# Patient Record
Sex: MALE | Race: WHITE | NOT HISPANIC OR LATINO | URBAN - METROPOLITAN AREA
[De-identification: names, ages, dates, MRNs, and addresses within clinical notes are randomized per-mention and may not be internally consistent; named-entity substitution may affect disease eponyms.]

---

## 2018-03-14 ENCOUNTER — EMERGENCY (EMERGENCY)
Facility: HOSPITAL | Age: 58
LOS: 1 days | Discharge: HOME | End: 2018-03-14
Attending: EMERGENCY MEDICINE
Payer: COMMERCIAL

## 2018-03-14 VITALS
HEART RATE: 84 BPM | TEMPERATURE: 98 F | DIASTOLIC BLOOD PRESSURE: 79 MMHG | SYSTOLIC BLOOD PRESSURE: 150 MMHG | OXYGEN SATURATION: 97 % | RESPIRATION RATE: 18 BRPM

## 2018-03-14 VITALS — WEIGHT: 250 LBS | HEIGHT: 72 IN

## 2018-03-14 DIAGNOSIS — Z86.69 PERSONAL HISTORY OF OTHER DISEASES OF THE NERVOUS SYSTEM AND SENSE ORGANS: Chronic | ICD-10-CM

## 2018-03-14 PROBLEM — Z00.00 ENCOUNTER FOR PREVENTIVE HEALTH EXAMINATION: Status: ACTIVE | Noted: 2018-03-14

## 2018-03-14 PROCEDURE — 99283 EMERGENCY DEPT VISIT LOW MDM: CPT

## 2018-03-14 PROCEDURE — 99282 EMERGENCY DEPT VISIT SF MDM: CPT

## 2018-03-14 RX ORDER — CIPROFLOXACIN AND DEXAMETHASONE 3; 1 MG/ML; MG/ML
4 SUSPENSION/ DROPS AURICULAR (OTIC)
Qty: 1 | Refills: 0
Start: 2018-03-14 | End: 2018-03-20

## 2018-03-14 NOTE — ED STATDOCS - OBJECTIVE STATEMENT
56 y/o M, presents to the ED c/o bloody discharge from his left ear s/p ear tube placement for chronic Otitis media.  Associated sx include ear numbness and muffled hearing.  Denies recent trauma or scratching of his ear.  Denies CP or SOB.

## 2018-03-14 NOTE — ED STATDOCS - ATTENDING CONTRIBUTION TO CARE
56 y/o M, presents to the ED c/o bloody discharge from his left ear s/p ear tube placement for chronic Otitis media.  The patient has blood in the auditory canal s/p ear tube      I, Alfonso Albarran, performed the initial face to face bedside interview with this patient regarding history of present illness, review of symptoms and relevant past medical, social and family history.  I completed an independent physical examination.  I was the initial provider who evaluated this patient. I have signed out the follow up of any pending tests (i.e. labs, radiological studies) to the ACP.  I have communicated the patient’s plan of care and disposition with the ACP.  The history, relevant review of systems, past medical and surgical history, medical decision making, and physical examination was documented by the scribe in my presence and I attest to the accuracy of the documentation.

## 2018-03-14 NOTE — ED STATDOCS - ENMT, MLM
Moist mucous membranes. left ear contains dried and fresh blood in auditory canal. can not visual TM

## 2018-03-14 NOTE — ED STATDOCS - PROGRESS NOTE DETAILS
Case d/w Dr Arredondo of ENT stating that it is ear infection with bleeding in the ear with ear tube Case d/w Dr Arredondo of ENT stating that it is ear infection with bleeding in the ear with ear tube. Dr Arredondo suggest Augmentin and Ciprodex. F/U with Dr. Arredondo as discussed.

## 2020-09-24 ENCOUNTER — APPOINTMENT (OUTPATIENT)
Dept: OTOLARYNGOLOGY | Facility: CLINIC | Age: 60
End: 2020-09-24
Payer: COMMERCIAL

## 2020-09-24 VITALS
HEIGHT: 73 IN | WEIGHT: 260 LBS | BODY MASS INDEX: 34.46 KG/M2 | HEART RATE: 86 BPM | DIASTOLIC BLOOD PRESSURE: 82 MMHG | SYSTOLIC BLOOD PRESSURE: 140 MMHG | TEMPERATURE: 97.1 F

## 2020-09-24 PROCEDURE — 31231 NASAL ENDOSCOPY DX: CPT

## 2020-09-24 PROCEDURE — 99204 OFFICE O/P NEW MOD 45 MIN: CPT | Mod: 25

## 2020-09-24 RX ORDER — METHYLPREDNISOLONE 4 MG/1
4 TABLET ORAL
Qty: 1 | Refills: 1 | Status: ACTIVE | COMMUNITY
Start: 2020-09-24 | End: 1900-01-01

## 2020-09-24 RX ORDER — SULFACETAMIDE SODIUM AND PREDNISOLONE SODIUM PHOSPHATE 100; 2.3 MG/ML; MG/ML
10-0.23 SOLUTION/ DROPS OPHTHALMIC
Qty: 1 | Refills: 5 | Status: ACTIVE | COMMUNITY
Start: 2020-09-24 | End: 1900-01-01

## 2020-09-24 NOTE — REASON FOR VISIT
[Initial Evaluation] : an initial evaluation for [Ear Drainage] : ear drainage [Hearing Loss] : hearing loss

## 2020-09-24 NOTE — ASSESSMENT
[FreeTextEntry1] : Left chr otorrhea\par C&S-left ear taken\par Rx- medrol dospak and  blephamide drops\par r/o CSF otorrhea\par    consider MRI of Temporal Bones and collect fluid for analysis\par \par f/u 10 days\par

## 2020-09-24 NOTE — HISTORY OF PRESENT ILLNESS
[M & T] : myringotomy tube [de-identified] : 61 yo male\par Chr left ear infections and drainage despite M&T and h2o precautions x months/years\par CT scan- opacification of middle ear and mastoid\par no nasla congestion\par no headaches\par no other modifying factors\par no nasal or throat complaints\par  [Tinnitus] : no tinnitus [Vertigo] : no vertigo [Anxiety] : no anxiety [Dizziness] : no dizziness [Headache] : no headache [Hearing Loss] : hearing loss [Otalgia] : no otalgia [Otorrhea] : no otorrhea [Recurrent Otitis Media] : recurrent otitis media [Otitis Media with Effusion] : otitis media with effusion [Meniere Disease] : no Meniere disease [Eustachian Tube Dysfunction] : eustachian tube dysfunction [Otosclerosis] : no otosclerosis [Perilymphatic Fistula] : no perilymphatic fistula [Hypertension] : no hypertension [Loud Noise Exposure] : no history of loud noise exposure [Smoking] : no smoking [Early Onset Hearing Loss] : no early onset hearing loss [Stroke] : no stroke [Facial Pain] : no facial pain [Facial Pressure] : no facial pressure [Nasal Congestion] : no nasal congestion [Ear Fullness] : no ear fullness [Allergic Rhinitis] : no allergic rhinitis [Environmental Allergies] : no environmental allergies [Seasonal Allergies] : no seasonal allergies [Environmental Allergens] : no environmental allergens [Adenoidectomy] : no adenoidectomy [Allergies] : no allergies [Asthma] : no asthma [Neck Mass] : no neck mass [Neck Pain] : no neck pain [Chills] : no chills [Cold Intolerance] : no cold intolerance [Cough] : no cough [Fatigue] : no fatigue [Heat Intolerance] : no heat intolerance [Hyperthyroidism] : no hyperthyroidism [Sialadenitis] : no sialadenitis [Hodgkin Disease] : no hodgkin disease [Non-Hodgkin Lymphoma] : no non-hodgkin lymphoma [Tobacco Use] : no tobacco use [Alcohol Use] : no alcohol use [Graves Disease] : no graves disease [Thyroid Cancer] : no thyroid cancer

## 2020-09-24 NOTE — PROCEDURE
[FreeTextEntry6] : \par Anterior Rhinoscopy insufficient to account for symptoms.\par \par After informed verbal consent is obtained, the fiberoptic nasal endoscope #29 is passed via the right nasal cavity.\par The following anatomic sites were directly examined in a sequential fashion:\par The scope was introduced in both  nasal passages between the middle and inferior turbinates to exam the inferior portion of the middle meatus and the fontanelle, as well as the maxillary ostia.  Next, the scope was passed medially and posteriorly to the middle turbinates to examine the sphenoethmoid recess and the superior turbinate region.\par   There is [ 0 ]% obstruction of the nasopharynx with adenoid tissue.\par \par A sl deviated nasal septum was noted not causing obstruction.\par The turbinates were congested-bilateral.\par \par Right Side:\par ·	Mucosa: wnl\par ·	Mucous: none\par ·	Polyp: none\par ·	Inferior Turbinate: sl congested\par ·	Middle Turbinate:sl congested\par ·	Superior Turbinate: wnl\par ·	Inferior Meatus:clear\par ·	Middle Meatus: clear\par ·	Super Meatus: clear\par ·	Sphenoethmoidal Recess: wnl\par \par \par \par Left Side:\par ·	Mucosa: wnl\par ·	Mucous:none\par ·	Polyp: none\par ·	Inferior Turbinate: sl congested\par ·	Middle Turbinate: sl congested\par ·	Superior Turbinate:wnl\par ·	Inferior Meatus: clear\par ·	Middle Meatus: clear\par ·	Super Meatus:clear\par ·	Sphenoethmoidal Recess: wnl\par \par \par

## 2020-09-24 NOTE — PHYSICAL EXAM
[de-identified] : left ear wax [de-identified] : left tube draining pulsating clear fluid [Nasal Endoscopy Performed] : nasal endoscopy was performed, see procedure section for findings [] : septum deviated to the left [Midline] : trachea located in midline position [Normal] : no rashes

## 2020-09-24 NOTE — PHYSICAL EXAM
[de-identified] : left ear wax [de-identified] : left tube draining pulsating clear fluid [Nasal Endoscopy Performed] : nasal endoscopy was performed, see procedure section for findings [] : septum deviated to the left [Midline] : trachea located in midline position [Normal] : no rashes

## 2020-09-24 NOTE — HISTORY OF PRESENT ILLNESS
[M & T] : myringotomy tube [de-identified] : 61 yo male\par Chr left ear infections and drainage despite M&T and h2o precautions x months/years\par CT scan- opacification of middle ear and mastoid\par no nasla congestion\par no headaches\par no other modifying factors\par no nasal or throat complaints\par  [Tinnitus] : no tinnitus [Vertigo] : no vertigo [Anxiety] : no anxiety [Dizziness] : no dizziness [Headache] : no headache [Hearing Loss] : hearing loss [Otalgia] : no otalgia [Otorrhea] : no otorrhea [Recurrent Otitis Media] : recurrent otitis media [Otitis Media with Effusion] : otitis media with effusion [Meniere Disease] : no Meniere disease [Eustachian Tube Dysfunction] : eustachian tube dysfunction [Otosclerosis] : no otosclerosis [Perilymphatic Fistula] : no perilymphatic fistula [Hypertension] : no hypertension [Loud Noise Exposure] : no history of loud noise exposure [Smoking] : no smoking [Early Onset Hearing Loss] : no early onset hearing loss [Stroke] : no stroke [Facial Pain] : no facial pain [Facial Pressure] : no facial pressure [Nasal Congestion] : no nasal congestion [Ear Fullness] : no ear fullness [Allergic Rhinitis] : no allergic rhinitis [Environmental Allergies] : no environmental allergies [Seasonal Allergies] : no seasonal allergies [Environmental Allergens] : no environmental allergens [Adenoidectomy] : no adenoidectomy [Allergies] : no allergies [Asthma] : no asthma [Neck Mass] : no neck mass [Neck Pain] : no neck pain [Chills] : no chills [Cold Intolerance] : no cold intolerance [Cough] : no cough [Fatigue] : no fatigue [Heat Intolerance] : no heat intolerance [Hyperthyroidism] : no hyperthyroidism [Sialadenitis] : no sialadenitis [Hodgkin Disease] : no hodgkin disease [Non-Hodgkin Lymphoma] : no non-hodgkin lymphoma [Tobacco Use] : no tobacco use [Alcohol Use] : no alcohol use [Graves Disease] : no graves disease [Thyroid Cancer] : no thyroid cancer

## 2020-09-27 ENCOUNTER — NON-APPOINTMENT (OUTPATIENT)
Age: 60
End: 2020-09-27

## 2020-09-28 LAB — BACTERIA SPEC CULT: NORMAL

## 2020-10-03 ENCOUNTER — APPOINTMENT (OUTPATIENT)
Dept: OTOLARYNGOLOGY | Facility: CLINIC | Age: 60
End: 2020-10-03
Payer: COMMERCIAL

## 2020-10-03 VITALS
SYSTOLIC BLOOD PRESSURE: 137 MMHG | BODY MASS INDEX: 34.46 KG/M2 | TEMPERATURE: 98.2 F | HEIGHT: 73 IN | WEIGHT: 260 LBS | DIASTOLIC BLOOD PRESSURE: 83 MMHG | HEART RATE: 88 BPM

## 2020-10-03 PROCEDURE — 92504 EAR MICROSCOPY EXAMINATION: CPT

## 2020-10-03 PROCEDURE — 99214 OFFICE O/P EST MOD 30 MIN: CPT | Mod: 25

## 2020-10-03 NOTE — PROCEDURE
[FreeTextEntry3] : Procedure note:  Binocular microscopy\par \par Oct 03, 2020 \par \par Inspection of the ears was performed under binocular microscopy because of need to accurately visualize otologic landmarks and potential pathologic conditions that would not otherwise be visible through simple otoscopic exam.\par

## 2020-10-03 NOTE — HISTORY OF PRESENT ILLNESS
[de-identified] : 60M with left ear drainage since left tube placement in March.  Occurring daily, clear to milky colored.  Associated popping/hearing loss left ear.  Has been present at least 5 years, possibly longer.  Mother with history of cholesteatoma.  Did not have ear infections

## 2020-10-03 NOTE — PHYSICAL EXAM
[de-identified] : AS:  patent tube with drainage, adjacent granulation, no retraction  AD:  normal [Normal] : no rashes [FreeTextEntry2] : Facial nerve function is House Brackmann 1/6 in right ear and 1/6 in left ear.

## 2020-10-05 ENCOUNTER — APPOINTMENT (OUTPATIENT)
Dept: OTOLARYNGOLOGY | Facility: CLINIC | Age: 60
End: 2020-10-05

## 2020-10-08 LAB — BETA-2 TRANSFERRIN: POSITIVE

## 2020-10-09 ENCOUNTER — OUTPATIENT (OUTPATIENT)
Dept: OUTPATIENT SERVICES | Facility: HOSPITAL | Age: 60
LOS: 1 days | End: 2020-10-09
Payer: COMMERCIAL

## 2020-10-09 ENCOUNTER — APPOINTMENT (OUTPATIENT)
Dept: MRI IMAGING | Facility: CLINIC | Age: 60
End: 2020-10-09
Payer: COMMERCIAL

## 2020-10-09 DIAGNOSIS — Z86.69 PERSONAL HISTORY OF OTHER DISEASES OF THE NERVOUS SYSTEM AND SENSE ORGANS: Chronic | ICD-10-CM

## 2020-10-09 DIAGNOSIS — H65.492 OTHER CHRONIC NONSUPPURATIVE OTITIS MEDIA, LEFT EAR: ICD-10-CM

## 2020-10-09 PROCEDURE — A9585: CPT

## 2020-10-09 PROCEDURE — 70553 MRI BRAIN STEM W/O & W/DYE: CPT | Mod: 26

## 2020-10-09 PROCEDURE — 70553 MRI BRAIN STEM W/O & W/DYE: CPT

## 2020-10-19 ENCOUNTER — APPOINTMENT (OUTPATIENT)
Dept: OTOLARYNGOLOGY | Facility: CLINIC | Age: 60
End: 2020-10-19
Payer: COMMERCIAL

## 2020-10-19 VITALS
WEIGHT: 260 LBS | BODY MASS INDEX: 34.46 KG/M2 | HEIGHT: 73 IN | TEMPERATURE: 97.6 F | SYSTOLIC BLOOD PRESSURE: 139 MMHG | DIASTOLIC BLOOD PRESSURE: 83 MMHG | HEART RATE: 95 BPM

## 2020-10-19 PROCEDURE — 92557 COMPREHENSIVE HEARING TEST: CPT

## 2020-10-19 PROCEDURE — 99072 ADDL SUPL MATRL&STAF TM PHE: CPT

## 2020-10-19 PROCEDURE — 99214 OFFICE O/P EST MOD 30 MIN: CPT | Mod: 25

## 2020-10-19 PROCEDURE — 92567 TYMPANOMETRY: CPT

## 2020-10-19 NOTE — PHYSICAL EXAM
[Normal] : no rashes [de-identified] : AS:  patent tube with drainage, adjacent granulation, no retraction  AD:  normal [FreeTextEntry2] : Facial nerve function is House Brackmann 1/6 in right ear and 1/6 in left ear.

## 2020-10-19 NOTE — CONSULT LETTER
[FreeTextEntry2] : Flako Cardona MD [FreeTextEntry1] : Dear Flako,\par \par Mr. Gilligan presents for followup for his left CSF otorrhea.  Since his last visit he has undergone MRI, and his sampled left ear drainage was found to be positive for beta-2 transferrin.  He has also seen one of my neurosurgery colleagues Dr. Garza.  We again discussed details and risks of transmastoid/middle repair of his CSF leak at length today, and he is ready to proceed.  This will be set up in the coming weeks.\par \par Thank you once again for the opportunity to participate in your patient's care, and I will keep you informed as to his progress.\par \par Best regards,\par \par Jai Carlson MD\par Otology/Neurotology\par [facility]\par [facility]\par

## 2020-10-19 NOTE — REASON FOR VISIT
[Subsequent Evaluation] : a subsequent evaluation for [FreeTextEntry2] : ear drainage, pt presents to follow up before surgery

## 2020-10-19 NOTE — DATA REVIEWED
[de-identified] : I personally reviewed the patient's audiogram, which shows left mostly CHL with a-b gap across all frequencies.\par  [de-identified] : I personally reviewed MRI images and the report, which shows left mastoid opacification, minor linear enhancement involving left middle fossa floor.\par

## 2020-10-23 ENCOUNTER — OUTPATIENT (OUTPATIENT)
Dept: OUTPATIENT SERVICES | Facility: HOSPITAL | Age: 60
LOS: 1 days | End: 2020-10-23
Payer: COMMERCIAL

## 2020-10-23 VITALS
RESPIRATION RATE: 14 BRPM | SYSTOLIC BLOOD PRESSURE: 146 MMHG | DIASTOLIC BLOOD PRESSURE: 92 MMHG | WEIGHT: 268.08 LBS | TEMPERATURE: 99 F | OXYGEN SATURATION: 97 % | HEIGHT: 72 IN | HEART RATE: 89 BPM

## 2020-10-23 DIAGNOSIS — H92.12 OTORRHEA, LEFT EAR: ICD-10-CM

## 2020-10-23 DIAGNOSIS — Z86.69 PERSONAL HISTORY OF OTHER DISEASES OF THE NERVOUS SYSTEM AND SENSE ORGANS: Chronic | ICD-10-CM

## 2020-10-23 DIAGNOSIS — Z98.52 VASECTOMY STATUS: Chronic | ICD-10-CM

## 2020-10-23 LAB
ANION GAP SERPL CALC-SCNC: 10 MMO/L — SIGNIFICANT CHANGE UP (ref 7–14)
BLD GP AB SCN SERPL QL: NEGATIVE — SIGNIFICANT CHANGE UP
BUN SERPL-MCNC: 16 MG/DL — SIGNIFICANT CHANGE UP (ref 7–23)
CALCIUM SERPL-MCNC: 8.7 MG/DL — SIGNIFICANT CHANGE UP (ref 8.4–10.5)
CHLORIDE SERPL-SCNC: 102 MMOL/L — SIGNIFICANT CHANGE UP (ref 98–107)
CO2 SERPL-SCNC: 26 MMOL/L — SIGNIFICANT CHANGE UP (ref 22–31)
CREAT SERPL-MCNC: 0.83 MG/DL — SIGNIFICANT CHANGE UP (ref 0.5–1.3)
GLUCOSE SERPL-MCNC: 194 MG/DL — HIGH (ref 70–99)
HCT VFR BLD CALC: 42.3 % — SIGNIFICANT CHANGE UP (ref 39–50)
HGB BLD-MCNC: 14.4 G/DL — SIGNIFICANT CHANGE UP (ref 13–17)
MCHC RBC-ENTMCNC: 29.2 PG — SIGNIFICANT CHANGE UP (ref 27–34)
MCHC RBC-ENTMCNC: 34 % — SIGNIFICANT CHANGE UP (ref 32–36)
MCV RBC AUTO: 85.8 FL — SIGNIFICANT CHANGE UP (ref 80–100)
NRBC # FLD: 0 K/UL — SIGNIFICANT CHANGE UP (ref 0–0)
PLATELET # BLD AUTO: 205 K/UL — SIGNIFICANT CHANGE UP (ref 150–400)
PMV BLD: 9 FL — SIGNIFICANT CHANGE UP (ref 7–13)
POTASSIUM SERPL-MCNC: 4 MMOL/L — SIGNIFICANT CHANGE UP (ref 3.5–5.3)
POTASSIUM SERPL-SCNC: 4 MMOL/L — SIGNIFICANT CHANGE UP (ref 3.5–5.3)
RBC # BLD: 4.93 M/UL — SIGNIFICANT CHANGE UP (ref 4.2–5.8)
RBC # FLD: 13.5 % — SIGNIFICANT CHANGE UP (ref 10.3–14.5)
RH IG SCN BLD-IMP: POSITIVE — SIGNIFICANT CHANGE UP
SODIUM SERPL-SCNC: 138 MMOL/L — SIGNIFICANT CHANGE UP (ref 135–145)
WBC # BLD: 8.75 K/UL — SIGNIFICANT CHANGE UP (ref 3.8–10.5)
WBC # FLD AUTO: 8.75 K/UL — SIGNIFICANT CHANGE UP (ref 3.8–10.5)

## 2020-10-23 PROCEDURE — 93010 ELECTROCARDIOGRAM REPORT: CPT

## 2020-10-23 RX ORDER — SODIUM CHLORIDE 9 MG/ML
1000 INJECTION, SOLUTION INTRAVENOUS
Refills: 0 | Status: DISCONTINUED | OUTPATIENT
Start: 2020-10-29 | End: 2020-10-30

## 2020-10-23 RX ORDER — SODIUM CHLORIDE 9 MG/ML
3 INJECTION INTRAMUSCULAR; INTRAVENOUS; SUBCUTANEOUS EVERY 8 HOURS
Refills: 0 | Status: DISCONTINUED | OUTPATIENT
Start: 2020-10-29 | End: 2020-11-01

## 2020-10-23 NOTE — H&P PST ADULT - HISTORY OF PRESENT ILLNESS
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 60 year old male presents with PST with chronic left ear drainage, progressively worsening recent evaluation tested fluid and noted CSF fluid leak, patient reports fullness of left ear with subsequent tubes placed to drain fluid and relieve pressure; denies headaches, weakness, changes in mental statusor any other complaint, Denies trauma or head injury  MRI imaging noted cerebellar herniation, now scheduled for infratemporal approach 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

## 2020-10-23 NOTE — H&P PST ADULT - NEGATIVE ENMT SYMPTOMS
no throat pain/no dysphagia/no nasal congestion/no vertigo/no ear pain/no nasal discharge/no dry mouth

## 2020-10-23 NOTE — H&P PST ADULT - NEUROLOGICAL DETAILS
alert and oriented x 3/sensation intact/normal strength sensation intact/normal strength/alert and oriented x 3/responds to verbal commands

## 2020-10-23 NOTE — H&P PST ADULT - ASSESSMENT
Problem: CSF leak  Assessment and Plan: Patient scheduled for surgery on 10/29/2020  Patient provided with verbal and written presurgical instructions; verbalized understanding  with teach back.    Patient provided with famotidine for GI prophylaxis; verbalized understanding.    Patient confirmed COVID appointment     OR booking notified of ADINA    EKG, Echo and Stress test requested    Cardiac evaluation requested by PST for abnormal EKG,  will obtain  Patient instructed to stop multivitamins and asa today    Problem: CSF leak  Assessment and Plan: Patient scheduled for surgery on 10/29/2020  Patient provided with verbal and written presurgical instructions; verbalized understanding  with teach back.    Chlorhexidene wash instructions discussed and verblaized understanding with teachback   Patient provided with famotidine for GI prophylaxis; verbalized understanding.    Patient confirmed COVID appointment     OR booking notified of ADINA    EKG, Echo and Stress test requested    Cardiac evaluation requested by PST for abnormal EKG, and elevated BP  will obtain  Patient instructed to stop multivitamins and asa today

## 2020-10-23 NOTE — H&P PST ADULT - ATTENDING COMMENTS
explained all the r/b/a of left middle fossa repair of csf leak with lumbar drain.  risk include but not limited to bleeding infection stroke paralyis death, csf leak needing further repair or shunt.  he understands and wishes to proceed with surgery

## 2020-10-23 NOTE — H&P PST ADULT - RS GEN PE MLT RESP DETAILS PC
no rales/breath sounds equal/clear to auscultation bilaterally/respirations non-labored/good air movement/airway patent/no rhonchi/no wheezes

## 2020-10-23 NOTE — H&P PST ADULT - NEGATIVE OPHTHALMOLOGIC SYMPTOMS
no irritation L/no loss of vision R/no blurred vision R/no blurred vision L/no irritation R/no diplopia/no photophobia/no loss of vision L

## 2020-10-23 NOTE — H&P PST ADULT - NSICDXPASTMEDICALHX_GEN_ALL_CORE_FT
PAST MEDICAL HISTORY:  Cerebrospinal fluid leak     LBBB (left bundle branch block)     Other chronic suppurative otitis media     Otorrhea, left

## 2020-10-23 NOTE — H&P PST ADULT - NEGATIVE NEUROLOGICAL SYMPTOMS
no difficulty walking/no weakness/no paresthesias/no generalized seizures/no confusion/no headache/no loss of sensation/no focal seizures/no syncope/no vertigo/no loss of consciousness/no hemiparesis/no facial palsy

## 2020-10-25 DIAGNOSIS — Z01.818 ENCOUNTER FOR OTHER PREPROCEDURAL EXAMINATION: ICD-10-CM

## 2020-10-26 ENCOUNTER — APPOINTMENT (OUTPATIENT)
Dept: DISASTER EMERGENCY | Facility: CLINIC | Age: 60
End: 2020-10-26

## 2020-10-26 LAB — SARS-COV-2 N GENE NPH QL NAA+PROBE: NOT DETECTED

## 2020-10-28 ENCOUNTER — TRANSCRIPTION ENCOUNTER (OUTPATIENT)
Age: 60
End: 2020-10-28

## 2020-10-29 ENCOUNTER — INPATIENT (INPATIENT)
Facility: HOSPITAL | Age: 60
LOS: 2 days | Discharge: HOME | End: 2020-11-01
Attending: NEUROLOGICAL SURGERY | Admitting: OTOLARYNGOLOGY
Payer: COMMERCIAL

## 2020-10-29 ENCOUNTER — APPOINTMENT (OUTPATIENT)
Dept: OTOLARYNGOLOGY | Facility: HOSPITAL | Age: 60
End: 2020-10-29

## 2020-10-29 VITALS
OXYGEN SATURATION: 96 % | DIASTOLIC BLOOD PRESSURE: 89 MMHG | RESPIRATION RATE: 14 BRPM | HEART RATE: 90 BPM | TEMPERATURE: 90 F | WEIGHT: 268.08 LBS | HEIGHT: 72 IN | SYSTOLIC BLOOD PRESSURE: 155 MMHG

## 2020-10-29 DIAGNOSIS — H92.12 OTORRHEA, LEFT EAR: ICD-10-CM

## 2020-10-29 DIAGNOSIS — Z86.69 PERSONAL HISTORY OF OTHER DISEASES OF THE NERVOUS SYSTEM AND SENSE ORGANS: Chronic | ICD-10-CM

## 2020-10-29 DIAGNOSIS — Z98.890 OTHER SPECIFIED POSTPROCEDURAL STATES: ICD-10-CM

## 2020-10-29 DIAGNOSIS — Z98.52 VASECTOMY STATUS: Chronic | ICD-10-CM

## 2020-10-29 LAB
BASE EXCESS BLDA CALC-SCNC: -4 MMOL/L — SIGNIFICANT CHANGE UP
CA-I BLDA-SCNC: 1.07 MMOL/L — LOW (ref 1.15–1.29)
GLUCOSE BLDA-MCNC: 111 MG/DL — HIGH (ref 70–99)
HCO3 BLDA-SCNC: 21 MMOL/L — LOW (ref 22–26)
HCT VFR BLDA CALC: 41.8 % — SIGNIFICANT CHANGE UP (ref 39–51)
HGB BLDA-MCNC: 13.6 G/DL — SIGNIFICANT CHANGE UP (ref 13–17)
PCO2 BLDA: 44 MMHG — SIGNIFICANT CHANGE UP (ref 35–48)
PH BLDA: 7.3 PH — LOW (ref 7.35–7.45)
PO2 BLDA: 212 MMHG — HIGH (ref 83–108)
POTASSIUM BLDA-SCNC: 4.4 MMOL/L — SIGNIFICANT CHANGE UP (ref 3.4–4.5)
RH IG SCN BLD-IMP: POSITIVE — SIGNIFICANT CHANGE UP
SAO2 % BLDA: 98.6 % — SIGNIFICANT CHANGE UP (ref 95–99)
SODIUM BLDA-SCNC: 137 MMOL/L — SIGNIFICANT CHANGE UP (ref 136–146)

## 2020-10-29 PROCEDURE — 69502 MASTOIDECTOMY: CPT | Mod: LT

## 2020-10-29 PROCEDURE — 61618 REPAIR DURA: CPT

## 2020-10-29 PROCEDURE — 69620 MYRINGOPLASTY: CPT | Mod: LT

## 2020-10-29 PROCEDURE — 92516 FACIAL NERVE FUNCTION TEST: CPT

## 2020-10-29 PROCEDURE — 15769 GRFG AUTOL SOFT TISS DIR EXC: CPT

## 2020-10-29 RX ORDER — ACETAMINOPHEN 500 MG
975 TABLET ORAL ONCE
Refills: 0 | Status: COMPLETED | OUTPATIENT
Start: 2020-10-29 | End: 2020-10-29

## 2020-10-29 RX ORDER — INFLUENZA VIRUS VACCINE 15; 15; 15; 15 UG/.5ML; UG/.5ML; UG/.5ML; UG/.5ML
0.5 SUSPENSION INTRAMUSCULAR ONCE
Refills: 0 | Status: DISCONTINUED | OUTPATIENT
Start: 2020-10-29 | End: 2020-11-01

## 2020-10-29 RX ORDER — CEFAZOLIN SODIUM 1 G
2000 VIAL (EA) INJECTION EVERY 8 HOURS
Refills: 0 | Status: DISCONTINUED | OUTPATIENT
Start: 2020-10-29 | End: 2020-11-01

## 2020-10-29 RX ORDER — OXYCODONE HYDROCHLORIDE 5 MG/1
5 TABLET ORAL EVERY 6 HOURS
Refills: 0 | Status: DISCONTINUED | OUTPATIENT
Start: 2020-10-29 | End: 2020-10-30

## 2020-10-29 RX ADMIN — Medication 975 MILLIGRAM(S): at 21:53

## 2020-10-29 RX ADMIN — SODIUM CHLORIDE 3 MILLILITER(S): 9 INJECTION INTRAMUSCULAR; INTRAVENOUS; SUBCUTANEOUS at 22:16

## 2020-10-29 RX ADMIN — Medication 100 MILLIGRAM(S): at 21:53

## 2020-10-29 RX ADMIN — Medication 975 MILLIGRAM(S): at 22:40

## 2020-10-29 NOTE — PATIENT PROFILE ADULT - ARE SIGNIFICANT INDICATORS COMPLETE.
----- Message from Dayanara Linder sent at 10/4/2018  7:33 AM CDT -----  Regarding: injection  Pt meets the guidelines for any of these knee injection(s). No pre-cert required. Okay to schedule for bilateral knee(s). The expiration date is 12/31/18 and must be scheduled prior to that.   No Yes

## 2020-10-29 NOTE — PROGRESS NOTE ADULT - PROBLEM SELECTOR PLAN 1
Neurologic checks Q1H  Advance diet to clears tonight, to regular in AM  Lumbar drain 10cc/Hour  monitor for CSF leak

## 2020-10-29 NOTE — CONSULT NOTE ADULT - ASSESSMENT
60 year old male presents with PST with chronic left ear drainage, progressively worsening recent evaluation tested fluid and noted CSF fluid leak, patient reports fullness of left ear with subsequent tubes placed to drain fluid and relieve pressure; denies headaches, weakness, changes in mental status or any other complaint, Denies trauma or head injury. 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 with split thickness calvarial graft, insertion of lumbar drain with Dr aGrza; Dr Carlson- left repair of CSF leak with fat graft, tissue graft, cartilage graft , complete mastoidectomy    Plan     Neuro:   s/p encephalocele w/ total mastoidectomy  - Q1 Neuro checks  - Lumbar drain, 10cc drained q1h  - Tylenol 975 PRN  - Oxy 5 and 10 for pain    CV  - Normotensive, normocardic  - F/u BP and HR  - F/u ASA per Neuro surg  - Holding home ASA per neurosurg    Resp  - Satting well on RA    GI  - Clears, advance to fulls in AM      - F/u ToV    Heme  - Holding DVT PPx per neurosurg  ID  - Ancef 2 grams q8    Endo     Lines  - L Rad A- line  - PIV x 2

## 2020-10-29 NOTE — PROGRESS NOTE ADULT - ASSESSMENT
61 YO male stable postop Subtemporal craniotomy for repair of CSF leak and encephalocele with placement of lumbar drain

## 2020-10-29 NOTE — PROGRESS NOTE ADULT - SUBJECTIVE AND OBJECTIVE BOX
Neurosurgery postop  C/O mild incisional pain  ICU Vital Signs Last 24 Hrs  T(C): 36.8 (29 Oct 2020 20:10), Max: 36.8 (29 Oct 2020 20:10)  T(F): 98.2 (29 Oct 2020 20:10), Max: 98.2 (29 Oct 2020 20:10)  HR: 88 (29 Oct 2020 21:00) (88 - 96)  BP: 157/87 (29 Oct 2020 20:10) (155/89 - 157/87)  BP(mean): 103 (29 Oct 2020 20:10) (103 - 103)  ABP: 155/87 (29 Oct 2020 21:00) (134/63 - 155/87)  ABP(mean): 112 (29 Oct 2020 21:00) (90 - 112)  RR: 9 (29 Oct 2020 21:00) (9 - 14)  SpO2: 97% (29 Oct 2020 21:00) (96% - 98%)    AAO X 3  PERRLA, EOMI  CN 2-12 grossly intact  PERERA strength 5/5, no drift  SILT    Lumbar drain patent    Wound C/D/I    MEDICATIONS  (STANDING):  lactated ringers. 1000 milliLiter(s) (30 mL/Hr) IV Continuous <Continuous>  sodium chloride 0.9% lock flush 3 milliLiter(s) IV Push every 8 hours    MEDICATIONS  (PRN):

## 2020-10-29 NOTE — PATIENT PROFILE ADULT - SURGICAL SITE DRAINAGE DESCRIPTION
Electrodesiccation And Curettage Text: The wound bed was treated with electrodesiccation and curettage after the biopsy was performed. Anesthesia Volume In Cc (Will Not Render If 0): 0.6 Billing Type: Third-Party Bill Post-Care Instructions: I reviewed with the patient in detail post-care instructions. Patient is to keep the biopsy site dry overnight, and then apply vaseline twice daily until healed. Dressing: telfa dressing Biopsy Method: Personna blade Hemostasis: Drysol Silver Nitrate Text: The wound bed was treated with silver nitrate after the biopsy was performed. Was A Bandage Applied: Yes Curettage Text: The wound bed was treated with curettage after the biopsy was performed. Bill For Surgical Tray: no Notification Instructions: Patient will be notified of biopsy results. However, patient instructed to call the office if not contacted within 2 weeks. Detail Level: Detailed Consent: Verbal consent was obtained and risks were reviewed including but not limited to scarring, infection, bleeding, scabbing, incomplete removal, nerve damage and allergy to anesthesia. Lab: [id number] Wound Care: Vaseline Lab Facility: [id number] Additional Anesthesia Volume In Cc (Will Not Render If 0): 0 Cryotherapy Text: The wound bed was treated with cryotherapy after the biopsy was performed. Anesthesia Type: 1% lidocaine with 1:100,000 epinephrine and a 1:10 solution of 8.4% sodium bicarbonate Depth Of Biopsy: dermis Type Of Destruction Used: Curettage Biopsy Type: H and E Electrodesiccation Text: The wound bed was treated with electrodesiccation after the biopsy was performed. Billing Type: Third-Party Bill Lab: [id number] Lab Facility: [id number] Lumbar drain

## 2020-10-29 NOTE — CONSULT NOTE ADULT - SUBJECTIVE AND OBJECTIVE BOX
SICU Consultation Note  =====================================================    HPI: 60y male presents with PST with chronic left ear drainage, progressively worsening recent evaluation tested fluid and noted CSF fluid leak, patient reported fullness of left ear with subsequent tubes placed to drain fluid and relieve pressure; denied headaches, weakness, denied trauma or head injury changes in mental status or any other complaint . 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     Allergies: Augmentin (Vomiting; Nausea)    PAST MEDICAL & SURGICAL HISTORY:  Otorrhea, left    Other chronic suppurative otitis media    Cerebrospinal fluid leak    LBBB (left bundle branch block)    H/O vasectomy  2006    History of placement of ear tubes      FAMILY HISTORY:  FH: heart disease  father      SOCIAL HISTORY:   - Occasional smoker  - Quit drinking 1973    ADVANCE DIRECTIVES: Presumed Full Code     REVIEW OF SYSTEMS:  ***  General: Non-Contributory  Skin/Breast: Non-Contributory  Ophthalmologic: Non-Contributory  ENMT: Non-Contributory  Respiratory and Thorax: Non-Contributory  Cardiovascular: Non-Contributory  Gastrointestinal: Non-Contributory  Genitourinary: Non-Contributory  Musculoskeletal: Non-Contributory  Neurological: Non-Contributory  Psychiatric: Non-Contributory  Hematology/Lymphatics: Non-Contributory  Endocrine: Non-Contributory  Allergic/Immunologic: Non-Contributory    HOME MEDICATIONS:  ***    CURRENT MEDICATIONS:   --------------------------------------------------------------------------------------  Neurologic Medications    Respiratory Medications    Cardiovascular Medications    Gastrointestinal Medications  lactated ringers. 1000 milliLiter(s) IV Continuous <Continuous>  sodium chloride 0.9% lock flush 3 milliLiter(s) IV Push every 8 hours    Genitourinary Medications    Hematologic/Oncologic Medications    Antimicrobial/Immunologic Medications    Endocrine/Metabolic Medications    Topical/Other Medications    --------------------------------------------------------------------------------------    VITAL SIGNS, INS/OUTS (last 24 hours):  --------------------------------------------------------------------------------------  ((Insert SICU Vitals / Is+Os here)) ***  --------------------------------------------------------------------------------------    EXAM  NEUROLOGY  RASS:   	GCS:    Exam: Normal, NAD, alert, oriented x 3, no focal deficits. ***    HEENT  Exam: Normocephalic, atraumatic.  EOMI ***    RESPIRATORY  Exam: Lungs clear to auscultation, Normal expansion/effort.  ***  Mechanical Ventilation:     CARDIOVASCULAR  Exam: S1, S2.  Regular rate and rhythm.  Peripheral edema  ***    GI/NUTRITION  Exam: Abdomen soft, Non-tender, Non-distended.  ***  Wound:   ***  Current Diet:  NPO ***    VASCULAR  Exam: Extremities warm, pink, well-perfused.  ***    MUSCULOSKELETAL  Exam: All extremities moving spontaneously without limitations.  ***    SKIN:  Exam: Good skin turgor, no skin breakdown.  ***    METABOLIC/FLUIDS/ELECTROLYTES  lactated ringers. 1000 milliLiter(s) IV Continuous <Continuous>  sodium chloride 0.9% lock flush 3 milliLiter(s) IV Push every 8 hours      HEMATOLOGIC  [x] DVT Prophylaxis:   Transfusions:	[] PRBC	[] Platelets		[] FFP	[] Cryoprecipitate    INFECTIOUS DISEASE  Antimicrobials/Immunologic Medications:    Day #  	of    ***    Tubes/Lines/Drains  ***  [x] Peripheral IV  [] Central Venous Line     	[] R	[] L	[] IJ	[] Fem	[] SC	Date Placed:   [] Arterial Line		[] R	[] L	[] Fem	[] Rad	[] Ax	Date Placed:   [] PICC:         	[] Midline		[] Mediport  [] Urinary Catheter		Date Placed:     LABS  --------------------------------------------------------------------------------------  ((Insert SICU Labs here))***  --------------------------------------------------------------------------------------    OTHER LABS    IMAGING RESULTS  Echo:   CT:   Xray:     ASSESSMENT:  60y Male ***    PLAN:  ***  Neurologic:   Respiratory:   Cardiovascular:   Gastrointestinal/Nutrition:   Renal/Genitourinary:   Hematologic:   Infectious Disease:   Tubes/Lines/Drains:   Endocrine:   Disposition:     --------------------------------------------------------------------------------------    Critical Care Diagnoses:   SICU Consultation Note  =====================================================    HPI: 60y male presents with PST with chronic left ear drainage, progressively worsening recent evaluation tested fluid and noted CSF fluid leak, patient reported fullness of left ear with subsequent tubes placed to drain fluid and relieve pressure; denied headaches, weakness, denied trauma or head injury changes in mental status or any other complaint . 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     Allergies: Augmentin (Vomiting; Nausea)    PAST MEDICAL & SURGICAL HISTORY:  Otorrhea, left    Other chronic suppurative otitis media    Cerebrospinal fluid leak    LBBB (left bundle branch block)    H/O vasectomy  2006    History of placement of ear tubes      FAMILY HISTORY:  FH: heart disease  father      SOCIAL HISTORY:   - Occasional smoker  - Quit drinking 1973    ADVANCE DIRECTIVES: Presumed Full Code     REVIEW OF SYSTEMS:     HOME MEDICATIONS:      CURRENT MEDICATIONS:   --------------------------------------------------------------------------------------  Neurologic Medications  oxyCODONE    IR 5 milliGRAM(s) Oral every 6 hours PRN Moderate Pain (4 - 6)    Respiratory Medications    Cardiovascular Medications    Gastrointestinal Medications  lactated ringers. 1000 milliLiter(s) IV Continuous <Continuous>  sodium chloride 0.9% lock flush 3 milliLiter(s) IV Push every 8 hours    Genitourinary Medications    Hematologic/Oncologic Medications  influenza   Vaccine 0.5 milliLiter(s) IntraMuscular once    Antimicrobial/Immunologic Medications  ceFAZolin   IVPB 2000 milliGRAM(s) IV Intermittent every 8 hours    Endocrine/Metabolic Medications    Topical/Other Medications  --------------------------------------------------------------------------------------    Vitals  T(C): 36.8 (10-29-20 @ 20:10), Max: 36.8 (10-29-20 @ 20:10)  HR: 98 (10-29-20 @ 23:00) (88 - 98)  BP: 157/87 (10-29-20 @ 20:10) (155/89 - 157/87)  BP(mean): 103 (10-29-20 @ 20:10) (103 - 103)  ABP: 153/83 (10-29-20 @ 23:00) (134/63 - 155/87)  ABP(mean): 106 (10-29-20 @ 23:00) (90 - 112)  RR: 10 (10-29-20 @ 23:00) (9 - 14)  SpO2: 97% (10-29-20 @ 23:00) (96% - 99%)  Wt(kg): --  CVP(mm Hg): --  CI: --  CAPILLARY BLOOD GLUCOSE       N/A      10-29 @ 07:01  -  10-30 @ 00:12  --------------------------------------------------------  IN:    IV PiggyBack: 50 mL    Oral Fluid: 200 mL  Total IN: 250 mL    OUT:    Drain (mL): 30 mL    Indwelling Catheter - Urethral (mL): 550 mL  Total OUT: 580 mL    Total NET: -330 mL      EXAM  NEUROLOGY  Exam: Normal, NAD, alert, oriented x 3, no focal deficits.     HEENT  Exam: Normocephalic, atraumatic.  EOMI     RESPIRATORY  Exam: Lungs clear to auscultation, Normal expansion/effort.      CARDIOVASCULAR  Exam: S1, S2.  Regular rate and rhythm.     GI/NUTRITION  Exam: Abdomen soft, Non-tender, Non-distended.   Diet: Clears    VASCULAR  Exam: Extremities warm, pink, well-perfused.      MUSCULOSKELETAL  Exam: All extremities moving spontaneously without limitations.    SKIN:  Exam: Good skin turgor, no skin breakdown.     METABOLIC/FLUIDS/ELECTROLYTES  lactated ringers. 1000 milliLiter(s) IV Continuous <Continuous>  sodium chloride 0.9% lock flush 3 milliLiter(s) IV Push every 8 hours      HEMATOLOGIC  [x] DVT Prophylaxis:   Transfusions:	[] PRBC	[] Platelets		[] FFP	[] Cryoprecipitate    INFECTIOUS DISEASE  Antimicrobials/Immunologic Medications:  ceFAZolin   IVPB 2000 milliGRAM(s) IV Intermittent every 8 hours    Tubes/Lines/Drains    [x] Peripheral IV   [] Central Venous Line     	[] R	[] L	[] IJ	[] Fem	[] SC	Date Placed:   [] Arterial Line		[] R	[x] L	[] Fem	[x] Rad	[] Ax	Date Placed:   [] PICC:         	[] Midline		[] Mediport  [] Urinary Catheter		Date Placed:     LABS  --------------------------------------------------------------------------------------  ABG - ( 29 Oct 2020 16:00 )  pH: 7.30  /  pCO2: 44    /  pO2: 212   / HCO3: 21    / Base Excess: -4.0  /  SaO2: 98.6  / Lactate: x          RECENT CULTURES:      ABG - ( 29 Oct 2020 16:00 )  pH, Arterial: 7.30  pH, Blood: x     /  pCO2: 44    /  pO2: 212   / HCO3: 21    / Base Excess: -4.0  /  SaO2: 98.6    --------------------------------------------------------------------------------------    OTHER LABS    IMAGING RESULTS  CT: F/u CT Head official read SICU Consultation Note  =====================================================    HPI: 60y male presents with PST with chronic left ear drainage, progressively worsening recent evaluation tested fluid and noted CSF fluid leak, patient reported fullness of left ear with subsequent tubes placed to drain fluid and relieve pressure; denied headaches, weakness, denied trauma or head injury changes in mental status or any other complaint . 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  [ ] Due to altered mental status/intubation, subjective information were not able to be obtained from the patient. History was obtained, to the extent possible, from review of the chart and collateral sources of information.    HOME MEDICATIONS:    ASA 81mg once daily    CURRENT MEDICATIONS:   --------------------------------------------------------------------------------------  Neurologic Medications  oxyCODONE    IR 5 milliGRAM(s) Oral every 6 hours PRN Moderate Pain (4 - 6)    Respiratory Medications    Cardiovascular Medications    Gastrointestinal Medications  lactated ringers. 1000 milliLiter(s) IV Continuous <Continuous>  sodium chloride 0.9% lock flush 3 milliLiter(s) IV Push every 8 hours    Genitourinary Medications    Hematologic/Oncologic Medications  influenza   Vaccine 0.5 milliLiter(s) IntraMuscular once    Antimicrobial/Immunologic Medications  ceFAZolin   IVPB 2000 milliGRAM(s) IV Intermittent every 8 hours    Endocrine/Metabolic Medications    Topical/Other Medications  --------------------------------------------------------------------------------------    Vitals  T(C): 36.8 (10-29-20 @ 20:10), Max: 36.8 (10-29-20 @ 20:10)  HR: 98 (10-29-20 @ 23:00) (88 - 98)  BP: 157/87 (10-29-20 @ 20:10) (155/89 - 157/87)  BP(mean): 103 (10-29-20 @ 20:10) (103 - 103)  ABP: 153/83 (10-29-20 @ 23:00) (134/63 - 155/87)  ABP(mean): 106 (10-29-20 @ 23:00) (90 - 112)  RR: 10 (10-29-20 @ 23:00) (9 - 14)  SpO2: 97% (10-29-20 @ 23:00) (96% - 99%)  Wt(kg): --  CVP(mm Hg): --  CI: --  CAPILLARY BLOOD GLUCOSE       N/A      10-29 @ 07:01  -  10-30 @ 00:12  --------------------------------------------------------  IN:    IV PiggyBack: 50 mL    Oral Fluid: 200 mL  Total IN: 250 mL    OUT:    Drain (mL): 30 mL    Indwelling Catheter - Urethral (mL): 550 mL  Total OUT: 580 mL    Total NET: -330 mL      EXAM  NEUROLOGY  Exam: Normal, NAD, alert, oriented x 3, no focal deficits.     HEENT  Exam: Normocephalic, atraumatic.  EOMI     RESPIRATORY  Exam: Lungs clear to auscultation, Normal expansion/effort.      CARDIOVASCULAR  Exam: S1, S2.  Regular rate and rhythm.     GI/NUTRITION  Exam: Abdomen soft, Non-tender, Non-distended.   Diet: Clears    VASCULAR  Exam: Extremities warm, pink, well-perfused.      MUSCULOSKELETAL  Exam: All extremities moving spontaneously without limitations.    SKIN:  Exam: Good skin turgor, no skin breakdown.     METABOLIC/FLUIDS/ELECTROLYTES  lactated ringers. 1000 milliLiter(s) IV Continuous <Continuous>  sodium chloride 0.9% lock flush 3 milliLiter(s) IV Push every 8 hours      HEMATOLOGIC  [x] DVT Prophylaxis:   Transfusions:	[] PRBC	[] Platelets		[] FFP	[] Cryoprecipitate    INFECTIOUS DISEASE  Antimicrobials/Immunologic Medications:  ceFAZolin   IVPB 2000 milliGRAM(s) IV Intermittent every 8 hours    Tubes/Lines/Drains    [x] Peripheral IV   [] Central Venous Line     	[] R	[] L	[] IJ	[] Fem	[] SC	Date Placed:   [] Arterial Line		[] R	[x] L	[] Fem	[x] Rad	[] Ax	Date Placed:   [] PICC:         	[] Midline		[] Mediport  [] Urinary Catheter		Date Placed:     LABS  --------------------------------------------------------------------------------------  ABG - ( 29 Oct 2020 16:00 )  pH: 7.30  /  pCO2: 44    /  pO2: 212   / HCO3: 21    / Base Excess: -4.0  /  SaO2: 98.6  / Lactate: x          RECENT CULTURES:      ABG - ( 29 Oct 2020 16:00 )  pH, Arterial: 7.30  pH, Blood: x     /  pCO2: 44    /  pO2: 212   / HCO3: 21    / Base Excess: -4.0  /  SaO2: 98.6    --------------------------------------------------------------------------------------    OTHER LABS    IMAGING RESULTS  CT: F/u CT Head official read

## 2020-10-30 LAB
ANION GAP SERPL CALC-SCNC: 15 MMO/L — HIGH (ref 7–14)
BUN SERPL-MCNC: 15 MG/DL — SIGNIFICANT CHANGE UP (ref 7–23)
CALCIUM SERPL-MCNC: 8.6 MG/DL — SIGNIFICANT CHANGE UP (ref 8.4–10.5)
CHLORIDE SERPL-SCNC: 100 MMOL/L — SIGNIFICANT CHANGE UP (ref 98–107)
CO2 SERPL-SCNC: 20 MMOL/L — LOW (ref 22–31)
CREAT SERPL-MCNC: 0.75 MG/DL — SIGNIFICANT CHANGE UP (ref 0.5–1.3)
GLUCOSE SERPL-MCNC: 205 MG/DL — HIGH (ref 70–99)
HCT VFR BLD CALC: 42.9 % — SIGNIFICANT CHANGE UP (ref 39–50)
HGB BLD-MCNC: 14.2 G/DL — SIGNIFICANT CHANGE UP (ref 13–17)
MAGNESIUM SERPL-MCNC: 1.8 MG/DL — SIGNIFICANT CHANGE UP (ref 1.6–2.6)
MCHC RBC-ENTMCNC: 28.5 PG — SIGNIFICANT CHANGE UP (ref 27–34)
MCHC RBC-ENTMCNC: 33.1 % — SIGNIFICANT CHANGE UP (ref 32–36)
MCV RBC AUTO: 86 FL — SIGNIFICANT CHANGE UP (ref 80–100)
NRBC # FLD: 0 K/UL — SIGNIFICANT CHANGE UP (ref 0–0)
PHOSPHATE SERPL-MCNC: 3.2 MG/DL — SIGNIFICANT CHANGE UP (ref 2.5–4.5)
PLATELET # BLD AUTO: 198 K/UL — SIGNIFICANT CHANGE UP (ref 150–400)
PMV BLD: 9.1 FL — SIGNIFICANT CHANGE UP (ref 7–13)
POTASSIUM SERPL-MCNC: 4.5 MMOL/L — SIGNIFICANT CHANGE UP (ref 3.5–5.3)
POTASSIUM SERPL-SCNC: 4.5 MMOL/L — SIGNIFICANT CHANGE UP (ref 3.5–5.3)
RBC # BLD: 4.99 M/UL — SIGNIFICANT CHANGE UP (ref 4.2–5.8)
RBC # FLD: 13.2 % — SIGNIFICANT CHANGE UP (ref 10.3–14.5)
SODIUM SERPL-SCNC: 135 MMOL/L — SIGNIFICANT CHANGE UP (ref 135–145)
WBC # BLD: 16.09 K/UL — HIGH (ref 3.8–10.5)
WBC # FLD AUTO: 16.09 K/UL — HIGH (ref 3.8–10.5)

## 2020-10-30 PROCEDURE — 99232 SBSQ HOSP IP/OBS MODERATE 35: CPT

## 2020-10-30 RX ORDER — ACETAMINOPHEN 500 MG
650 TABLET ORAL EVERY 6 HOURS
Refills: 0 | Status: DISCONTINUED | OUTPATIENT
Start: 2020-10-30 | End: 2020-11-01

## 2020-10-30 RX ORDER — CIPROFLOXACIN AND DEXAMETHASONE 3; 1 MG/ML; MG/ML
5 SUSPENSION/ DROPS AURICULAR (OTIC)
Refills: 0 | Status: DISCONTINUED | OUTPATIENT
Start: 2020-10-30 | End: 2020-11-01

## 2020-10-30 RX ORDER — OXYCODONE HYDROCHLORIDE 5 MG/1
5 TABLET ORAL EVERY 6 HOURS
Refills: 0 | Status: DISCONTINUED | OUTPATIENT
Start: 2020-10-30 | End: 2020-11-01

## 2020-10-30 RX ORDER — ACETAMINOPHEN 500 MG
1000 TABLET ORAL ONCE
Refills: 0 | Status: COMPLETED | OUTPATIENT
Start: 2020-10-30 | End: 2020-10-30

## 2020-10-30 RX ADMIN — OXYCODONE HYDROCHLORIDE 5 MILLIGRAM(S): 5 TABLET ORAL at 22:44

## 2020-10-30 RX ADMIN — Medication 100 MILLIGRAM(S): at 05:48

## 2020-10-30 RX ADMIN — Medication 100 MILLIGRAM(S): at 14:07

## 2020-10-30 RX ADMIN — CIPROFLOXACIN AND DEXAMETHASONE 5 DROP(S): 3; 1 SUSPENSION/ DROPS AURICULAR (OTIC) at 06:39

## 2020-10-30 RX ADMIN — OXYCODONE HYDROCHLORIDE 5 MILLIGRAM(S): 5 TABLET ORAL at 23:04

## 2020-10-30 RX ADMIN — Medication 100 MILLIGRAM(S): at 22:20

## 2020-10-30 RX ADMIN — OXYCODONE HYDROCHLORIDE 5 MILLIGRAM(S): 5 TABLET ORAL at 16:40

## 2020-10-30 RX ADMIN — SODIUM CHLORIDE 3 MILLILITER(S): 9 INJECTION INTRAMUSCULAR; INTRAVENOUS; SUBCUTANEOUS at 12:06

## 2020-10-30 RX ADMIN — SODIUM CHLORIDE 3 MILLILITER(S): 9 INJECTION INTRAMUSCULAR; INTRAVENOUS; SUBCUTANEOUS at 04:39

## 2020-10-30 RX ADMIN — CIPROFLOXACIN AND DEXAMETHASONE 5 DROP(S): 3; 1 SUSPENSION/ DROPS AURICULAR (OTIC) at 16:43

## 2020-10-30 RX ADMIN — OXYCODONE HYDROCHLORIDE 5 MILLIGRAM(S): 5 TABLET ORAL at 17:00

## 2020-10-30 RX ADMIN — Medication 650 MILLIGRAM(S): at 10:30

## 2020-10-30 RX ADMIN — Medication 1000 MILLIGRAM(S): at 18:11

## 2020-10-30 RX ADMIN — Medication 650 MILLIGRAM(S): at 10:00

## 2020-10-30 RX ADMIN — Medication 400 MILLIGRAM(S): at 18:00

## 2020-10-30 RX ADMIN — SODIUM CHLORIDE 3 MILLILITER(S): 9 INJECTION INTRAMUSCULAR; INTRAVENOUS; SUBCUTANEOUS at 22:31

## 2020-10-30 NOTE — CHART NOTE - NSCHARTNOTEFT_GEN_A_CORE
CAPRINI SCORE [CLOT]    AGE RELATED RISK FACTORS                                                       MOBILITY RELATED FACTORS  [ x] Age 41-60 years                                            (1 Point)                  [ x] Bed rest                                                        (1 Point)  [ ] Age: 61-74 years                                           (2 Points)                 [ ] Plaster cast                                                   (2 Points)  [ ] Age= 75 years                                              (3 Points)                 [ ] Bed bound for more than 72 hours                 (2 Points)    DISEASE RELATED RISK FACTORS                                               GENDER SPECIFIC FACTORS  [ ] Edema in the lower extremities                       (1 Point)                  [ ] Pregnancy                                                     (1 Point)  [ ] Varicose veins                                               (1 Point)                  [ ] Post-partum < 6 weeks                                   (1 Point)             [ ] BMI > 25 Kg/m2                                            (1 Point)                  [ ] Hormonal therapy  or oral contraception          (1 Point)                 [ ] Sepsis (in the previous month)                        (1 Point)                  [ ] History of pregnancy complications                 (1 point)  [ ] Pneumonia or serious lung disease                                               [ ] Unexplained or recurrent                     (1 Point)           (in the previous month)                               (1 Point)  [ ] Abnormal pulmonary function test                     (1 Point)                 SURGERY RELATED RISK FACTORS  [ ] Acute myocardial infarction                              (1 Point)                 [ ]  Section                                             (1 Point)  [ ] Congestive heart failure (in the previous month)  (1 Point)               [ ] Minor surgery                                                  (1 Point)   [ ] Inflammatory bowel disease                             (1 Point)                 [ ] Arthroscopic surgery                                        (2 Points)  [ ] Central venous access                                      (2 Points)                [x ] General surgery lasting more than 45 minutes   (2 Points)       [ ] Stroke (in the previous month)                          (5 Points)               [ ] Elective arthroplasty                                         (5 Points)                                                                                                                                               HEMATOLOGY RELATED FACTORS                                                 TRAUMA RELATED RISK FACTORS  [ ] Prior episodes of VTE                                     (3 Points)                [ ] Fracture of the hip, pelvis, or leg                       (5 Points)  [ ] Positive family history for VTE                         (3 Points)                 [ ] Acute spinal cord injury (in the previous month)  (5 Points)  [ ] Prothrombin 51343 A                                     (3 Points)                 [ ] Paralysis  (less than 1 month)                             (5 Points)  [ ] Factor V Leiden                                             (3 Points)                  [ ] Multiple Trauma within 1 month                        (5 Points)  [ ] Lupus anticoagulants                                     (3 Points)                                                           [ ] Anticardiolipin antibodies                               (3 Points)                                                       [ ] High homocysteine in the blood                      (3 Points)                                             [ ] Other congenital or acquired thrombophilia      (3 Points)                                                [ ] Heparin induced thrombocytopenia                  (3 Points)                                          Total Score [     4     ]    Caprini Score 0 - 2:  Low Risk, No VTE Prophylaxis required for most patients, encourage ambulation  Caprini Score 3 - 6:  At Risk, pharmacologic VTE prophylaxis is indicated for most patients (in the absence of a contraindication)  Caprini Score Greater than or = 7:  High Risk, pharmacologic VTE prophylaxis is indicated for most patients (in the absence of a contraindication)

## 2020-10-30 NOTE — PROVIDER CONTACT NOTE (CHANGE IN STATUS NOTIFICATION) - BACKGROUND
Pt s/p lumbar drain placement yesterday. Amount being drained per hour decreased from 10mLs/hr to 5mLs/hr at start of shift due to worsening headaches during day shift

## 2020-10-30 NOTE — PROGRESS NOTE ADULT - SUBJECTIVE AND OBJECTIVE BOX
C/O mild left palm numbness  ICU Vital Signs Last 24 Hrs  T(C): 36.7 (30 Oct 2020 00:00), Max: 36.8 (29 Oct 2020 20:10)  T(F): 98 (30 Oct 2020 00:00), Max: 98.2 (29 Oct 2020 20:10)  HR: 94 (30 Oct 2020 02:00) (88 - 98)  BP: 131/79 (30 Oct 2020 02:00) (131/79 - 157/87)  BP(mean): 90 (30 Oct 2020 02:00) (90 - 103)  ABP: 146/79 (30 Oct 2020 01:00) (134/63 - 155/87)  ABP(mean): 100 (30 Oct 2020 01:00) (90 - 112)  RR: 14 (30 Oct 2020 02:00) (9 - 14)  SpO2: 96% (30 Oct 2020 02:00) (96% - 99%)    AAO X 3  PERRLA, EOMI  CN 2-12 grossly intact  PERERA strength 5/5, no drift  Decreased sensation left palm otherwise SILT    MEDICATIONS  (STANDING):  ceFAZolin   IVPB 2000 milliGRAM(s) IV Intermittent every 8 hours  influenza   Vaccine 0.5 milliLiter(s) IntraMuscular once  lactated ringers. 1000 milliLiter(s) (30 mL/Hr) IV Continuous <Continuous>  sodium chloride 0.9% lock flush 3 milliLiter(s) IV Push every 8 hours    MEDICATIONS  (PRN):  oxyCODONE    IR 5 milliGRAM(s) Oral every 6 hours PRN Moderate Pain (4 - 6)                          14.2   16.09 )-----------( 198      ( 30 Oct 2020 00:11 )             42.9   10-30    135  |  100  |  15  ----------------------------<  205<H>  4.5   |  20<L>  |  0.75    Ca    8.6      30 Oct 2020 00:11  Phos  3.2     10-30  Mg     1.8     10-30

## 2020-10-30 NOTE — PROGRESS NOTE ADULT - PROBLEM SELECTOR PLAN 1
Neurologic checks Q1H  Advance diet to clears tonight, to regular in AM  Lumbar drain 10cc/Hour  monitor for CSF leak  Reevaluate left hand sensation after arterial line removed

## 2020-10-30 NOTE — PROGRESS NOTE ADULT - SUBJECTIVE AND OBJECTIVE BOX
HISTORY   60y male presents with PST with chronic left ear drainage, progressively worsening recent evaluation tested fluid and noted CSF fluid leak, patient reported fullness of left ear with subsequent tubes placed to drain fluid and relieve pressure; denied headaches, weakness, denied trauma or head injury changes in mental status or any other complaint . 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    24 HOUR EVENTS:  - patient ok to ambulate IF lumbar drain clamped and protected  - left haney numbness over all four digits; not neurologic distribution  - d/c a-line per SICU and NSG    SUBJECTIVE/ROS:  [x] A ten-point review of systems was otherwise negative except as noted.  [ ] Due to altered mental status/intubation, subjective information were not able to be obtained from the patient. History was obtained, to the extent possible, from review of the chart and collateral sources of information.      NEURO  Awake, alert, oriented  Exam: strength intact bilaterally, sensation only decreased over haney surface of left hand only distal to the wrist  Meds: oxyCODONE    IR 5 milliGRAM(s) Oral every 6 hours PRN Moderate Pain (4 - 6)    [x] Adequacy of sedation and pain control has been assessed and adjusted      RESPIRATORY  RR: 10 (10-29-20 @ 23:00) (9 - 14)  SpO2: 97% (10-29-20 @ 23:00) (96% - 99%)  Wt(kg): --  Exam: Lungs clear to auscultation, Normal expansion/effort.    Mechanical Ventilation:   ABG - ( 29 Oct 2020 16:00 )  pH: 7.30  /  pCO2: 44    /  pO2: 212   / HCO3: 21    / Base Excess: -4.0  /  SaO2: 98.6    Lactate: x        [ ] Extubation Readiness Assessed  Meds:       CARDIOVASCULAR  HR: 98 (10-29-20 @ 23:00) (88 - 98)  BP: 157/87 (10-29-20 @ 20:10) (155/89 - 157/87)  BP(mean): 103 (10-29-20 @ 20:10) (103 - 103)  ABP: 153/83 (10-29-20 @ 23:00) (134/63 - 155/87)  ABP(mean): 106 (10-29-20 @ 23:00) (90 - 112)  Wt(kg): --  CVP(cm H2O): --  Exam: S1, S2.  Regular rate and rhythm.   Cardiac Rhythm:  Perfusion     [x]Adequate   [ ]Inadequate  Mentation   [X]Normal       [ ]Reduced  Extremities  [x]Warm         [ ]Cool  Volume Status [ ]Hypervolemic [ ]Euvolemic [ ]Hypovolemic  Meds:       GI/NUTRITION  Exam: soft, nontender, nondistended  Meds:     GENITOURINARY  I&O's Detail    10-29 @ 07:01  -  10-30 @ 00:38  --------------------------------------------------------  IN:    IV PiggyBack: 50 mL    Oral Fluid: 200 mL  Total IN: 250 mL    OUT:    Drain (mL): 30 mL    Indwelling Catheter - Urethral (mL): 550 mL  Total OUT: 580 mL    Total NET: -330 mL        Weight (kg): 121.6 (10-29 @ 10:03)        [ ] Diaz catheter, indication: N/A  Meds: lactated ringers. 1000 milliLiter(s) IV Continuous <Continuous>  sodium chloride 0.9% lock flush 3 milliLiter(s) IV Push every 8 hours        HEMATOLOGIC  Meds:   [x] VTE Prophylaxis      Transfusion     [ ] PRBC   [ ] Platelets   [ ] FFP   [ ] Cryoprecipitate      INFECTIOUS DISEASES  T(C): 36.8 (10-29-20 @ 20:10), Max: 36.8 (10-29-20 @ 20:10)  Wt(kg): --    Recent Cultures:    Meds: ceFAZolin   IVPB 2000 milliGRAM(s) IV Intermittent every 8 hours  influenza   Vaccine 0.5 milliLiter(s) IntraMuscular once        ENDOCRINE  Capillary Blood Glucose    Meds:       ACCESS DEVICES:  [x] Peripheral IV  [ ] Central Venous Line	[ ] R	[ ] L	[ ] IJ	[ ] Fem	[ ] SC	Placed:   [d/c] Arterial Line		[ ] R	[ ] L	[ ] Fem	[ ] Rad	[ ] Ax	Placed:   [ ] PICC:					[ ] Mediport  [ ] Urinary Catheter, Date Placed:   [ ] Necessity of urinary, arterial, and venous catheters discussed    OTHER MEDICATIONS:

## 2020-10-30 NOTE — PROVIDER CONTACT NOTE (CHANGE IN STATUS NOTIFICATION) - ACTION/TREATMENT ORDERED:
TREVIN Rubin notified TREVIN Burrows of situation. Per TREVIN Burrows, continue with draining lumbar drain 5mLs/hr and reevaluate headaches following administration of pain meds

## 2020-10-30 NOTE — PROGRESS NOTE ADULT - SUBJECTIVE AND OBJECTIVE BOX
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    patient tolerated CLD  no drainage from the incision or ear  has L hand numbness likely from alex, a line removed  q1h LD draining 10cc/hr with neurochecks wnl thus far      Allergies    Augmentin (Vomiting; Nausea)    Intolerances      MEDICATIONS  (STANDING):  ceFAZolin   IVPB 2000 milliGRAM(s) IV Intermittent every 8 hours  ciprofloxacin/dexamethasone Suspension Otic 5 Drop(s) Left Ear two times a day  influenza   Vaccine 0.5 milliLiter(s) IntraMuscular once  sodium chloride 0.9% lock flush 3 milliLiter(s) IV Push every 8 hours    MEDICATIONS  (PRN):  oxyCODONE    IR 5 milliGRAM(s) Oral every 6 hours PRN Moderate Pain (4 - 6)        Vital Signs Last 24 Hrs  T(C): 36.7 (30 Oct 2020 00:00), Max: 36.8 (29 Oct 2020 20:10)  T(F): 98 (30 Oct 2020 00:00), Max: 98.2 (29 Oct 2020 20:10)  HR: 89 (30 Oct 2020 05:00) (88 - 98)  BP: 121/67 (30 Oct 2020 05:00) (119/74 - 157/87)  BP(mean): 80 (30 Oct 2020 05:00) (78 - 103)  RR: 16 (30 Oct 2020 05:00) (9 - 16)  SpO2: 96% (30 Oct 2020 05:00) (95% - 99%)    Physical Exam:  Alert, NAD  incision c/d/i, soft, no oozing, no hematoma  CN II-XII intact  L ear packed with gel foam, not draining or bleeding  Nonlabored Respirations       10-29-20 @ 07:01  -  10-30-20 @ 05:39  --------------------------------------------------------  IN: 950 mL / OUT: 640 mL / NET: 310 mL                              14.2   16.09 )-----------( 198      ( 30 Oct 2020 00:11 )             42.9    10-30    135  |  100  |  15  ----------------------------<  205<H>  4.5   |  20<L>  |  0.75    Ca    8.6      30 Oct 2020 00:11  Phos  3.2     10-30  Mg     1.8     10-30           A/P: 59yo s/p mastoidectomy, MCF approach to 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

## 2020-10-30 NOTE — PROGRESS NOTE ADULT - ASSESSMENT
61 YO male stable POD # 1 S/P Subtemporal craniotomy for repair of CSF leak and encephalocele with placement of lumbar drain  Mild left palm numbness likely due to arterial line

## 2020-10-30 NOTE — PROGRESS NOTE ADULT - ASSESSMENT
60 year old male presents with PST with chronic left ear drainage, progressively worsening recent evaluation tested fluid and noted CSF fluid leak, patient reports fullness of left ear with subsequent tubes placed to drain fluid and relieve pressure; denies headaches, weakness, changes in mental status or any other complaint, Denies trauma or head injury. 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    Plan     Neuro:   s/p encephalocele w/ total mastoidectomy  - Q1 Neuro checks  - monitor numbness left hand  - Lumbar drain, 10cc drained q1h  - Tylenol 975 PRN  - Oxy 5 and 10 for pain    CV  - Normotensive, normocardic  - F/u BP and HR  - F/u ASA per Neuro surg  - Holding home ASA per neurosurg  - d/c a-line    Resp  - Satting well on RA    GI  - Clears, advance to fulls this AM      - F/u ToV    Heme  - Holding DVT PPx per neurosurg  ID  - Ancef 2 grams q8    Endo     Lines  - L Rad A- line- d/c  - PIV x 2      60 year old male presents with PST with chronic left ear drainage, progressively worsening recent evaluation tested fluid and noted CSF fluid leak, patient reports fullness of left ear with subsequent tubes placed to drain fluid and relieve pressure; denies headaches, weakness, changes in mental status or any other complaint, Denies trauma or head injury. 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    Plan     Neuro:   s/p encephalocele w/ total mastoidectomy  - Q1 Neuro checks  - monitor numbness left hand  - Lumbar drain, 10cc drained q1h  - Tylenol 975 PRN  - Oxy 5 and 10 for pain    CV  - Normotensive, normocardic  - F/u BP and HR  - Holding home ASA per neurosurg  - d/c a-line    Resp  - Satting well on RA    GI  - Regular diet, tolerating well      - Passed ToV s/p livingston removal    Heme  - Holding pharmaceutical DVT PPx per neurosurg  - SCDs     ID  - Ancef 2 grams q8    Endo   - f/u A1C     Lines  - L Rad A- line- d/c  - PIV x 2

## 2020-10-31 LAB
ANION GAP SERPL CALC-SCNC: 14 MMO/L — SIGNIFICANT CHANGE UP (ref 7–14)
BUN SERPL-MCNC: 13 MG/DL — SIGNIFICANT CHANGE UP (ref 7–23)
CALCIUM SERPL-MCNC: 8.4 MG/DL — SIGNIFICANT CHANGE UP (ref 8.4–10.5)
CHLORIDE SERPL-SCNC: 99 MMOL/L — SIGNIFICANT CHANGE UP (ref 98–107)
CO2 SERPL-SCNC: 20 MMOL/L — LOW (ref 22–31)
CREAT SERPL-MCNC: 0.72 MG/DL — SIGNIFICANT CHANGE UP (ref 0.5–1.3)
GLUCOSE SERPL-MCNC: 213 MG/DL — HIGH (ref 70–99)
HBA1C BLD-MCNC: 6.9 % — HIGH (ref 4–5.6)
HCT VFR BLD CALC: 40.2 % — SIGNIFICANT CHANGE UP (ref 39–50)
HGB BLD-MCNC: 13.3 G/DL — SIGNIFICANT CHANGE UP (ref 13–17)
MAGNESIUM SERPL-MCNC: 2 MG/DL — SIGNIFICANT CHANGE UP (ref 1.6–2.6)
MCHC RBC-ENTMCNC: 29.1 PG — SIGNIFICANT CHANGE UP (ref 27–34)
MCHC RBC-ENTMCNC: 33.1 % — SIGNIFICANT CHANGE UP (ref 32–36)
MCV RBC AUTO: 88 FL — SIGNIFICANT CHANGE UP (ref 80–100)
NRBC # FLD: 0 K/UL — SIGNIFICANT CHANGE UP (ref 0–0)
PHOSPHATE SERPL-MCNC: 2.6 MG/DL — SIGNIFICANT CHANGE UP (ref 2.5–4.5)
PLATELET # BLD AUTO: 163 K/UL — SIGNIFICANT CHANGE UP (ref 150–400)
PMV BLD: 9.2 FL — SIGNIFICANT CHANGE UP (ref 7–13)
POTASSIUM SERPL-MCNC: 4.2 MMOL/L — SIGNIFICANT CHANGE UP (ref 3.5–5.3)
POTASSIUM SERPL-SCNC: 4.2 MMOL/L — SIGNIFICANT CHANGE UP (ref 3.5–5.3)
RBC # BLD: 4.57 M/UL — SIGNIFICANT CHANGE UP (ref 4.2–5.8)
RBC # FLD: 13.5 % — SIGNIFICANT CHANGE UP (ref 10.3–14.5)
SODIUM SERPL-SCNC: 133 MMOL/L — LOW (ref 135–145)
WBC # BLD: 14.85 K/UL — HIGH (ref 3.8–10.5)
WBC # FLD AUTO: 14.85 K/UL — HIGH (ref 3.8–10.5)

## 2020-10-31 PROCEDURE — 99232 SBSQ HOSP IP/OBS MODERATE 35: CPT

## 2020-10-31 RX ORDER — OXYCODONE HYDROCHLORIDE 5 MG/1
5 TABLET ORAL ONCE
Refills: 0 | Status: DISCONTINUED | OUTPATIENT
Start: 2020-10-31 | End: 2020-10-31

## 2020-10-31 RX ADMIN — Medication 650 MILLIGRAM(S): at 22:14

## 2020-10-31 RX ADMIN — CIPROFLOXACIN AND DEXAMETHASONE 5 DROP(S): 3; 1 SUSPENSION/ DROPS AURICULAR (OTIC) at 18:09

## 2020-10-31 RX ADMIN — OXYCODONE HYDROCHLORIDE 5 MILLIGRAM(S): 5 TABLET ORAL at 05:56

## 2020-10-31 RX ADMIN — Medication 100 MILLIGRAM(S): at 05:18

## 2020-10-31 RX ADMIN — OXYCODONE HYDROCHLORIDE 5 MILLIGRAM(S): 5 TABLET ORAL at 19:05

## 2020-10-31 RX ADMIN — SODIUM CHLORIDE 3 MILLILITER(S): 9 INJECTION INTRAMUSCULAR; INTRAVENOUS; SUBCUTANEOUS at 21:01

## 2020-10-31 RX ADMIN — OXYCODONE HYDROCHLORIDE 5 MILLIGRAM(S): 5 TABLET ORAL at 18:25

## 2020-10-31 RX ADMIN — OXYCODONE HYDROCHLORIDE 5 MILLIGRAM(S): 5 TABLET ORAL at 10:50

## 2020-10-31 RX ADMIN — Medication 650 MILLIGRAM(S): at 14:13

## 2020-10-31 RX ADMIN — OXYCODONE HYDROCHLORIDE 5 MILLIGRAM(S): 5 TABLET ORAL at 10:20

## 2020-10-31 RX ADMIN — CIPROFLOXACIN AND DEXAMETHASONE 5 DROP(S): 3; 1 SUSPENSION/ DROPS AURICULAR (OTIC) at 05:18

## 2020-10-31 RX ADMIN — Medication 650 MILLIGRAM(S): at 14:43

## 2020-10-31 RX ADMIN — Medication 100 MILLIGRAM(S): at 21:01

## 2020-10-31 RX ADMIN — OXYCODONE HYDROCHLORIDE 5 MILLIGRAM(S): 5 TABLET ORAL at 01:18

## 2020-10-31 RX ADMIN — Medication 650 MILLIGRAM(S): at 22:41

## 2020-10-31 RX ADMIN — OXYCODONE HYDROCHLORIDE 5 MILLIGRAM(S): 5 TABLET ORAL at 06:30

## 2020-10-31 RX ADMIN — SODIUM CHLORIDE 3 MILLILITER(S): 9 INJECTION INTRAMUSCULAR; INTRAVENOUS; SUBCUTANEOUS at 16:07

## 2020-10-31 RX ADMIN — OXYCODONE HYDROCHLORIDE 5 MILLIGRAM(S): 5 TABLET ORAL at 01:40

## 2020-10-31 RX ADMIN — Medication 650 MILLIGRAM(S): at 01:00

## 2020-10-31 RX ADMIN — SODIUM CHLORIDE 3 MILLILITER(S): 9 INJECTION INTRAMUSCULAR; INTRAVENOUS; SUBCUTANEOUS at 05:18

## 2020-10-31 RX ADMIN — Medication 100 MILLIGRAM(S): at 16:56

## 2020-10-31 RX ADMIN — Medication 650 MILLIGRAM(S): at 00:18

## 2020-10-31 NOTE — PROVIDER CONTACT NOTE (CHANGE IN STATUS NOTIFICATION) - SITUATION
Pt complaining of worsening headaches (8/10 pain) despite receiving oxy and tylenol during the night

## 2020-10-31 NOTE — PROVIDER CONTACT NOTE (CHANGE IN STATUS NOTIFICATION) - ACTION/TREATMENT ORDERED:
Per TREVIN Burrows, continue to drain 5mLs/hr. MD Garza will be made aware of situation by TREVIN Burrows

## 2020-10-31 NOTE — PROGRESS NOTE ADULT - SUBJECTIVE AND OBJECTIVE BOX
C/O severe headaches with 10cc/hour of CSF drainage  Reduced to 5cc/hour, initially had much milder headache, pain now 8/10 with 5cc  Left hand numbness improved after removal of arterial line  ICU Vital Signs Last 24 Hrs  T(C): 37.1 (31 Oct 2020 00:00), Max: 37.5 (30 Oct 2020 20:00)  T(F): 98.8 (31 Oct 2020 00:00), Max: 99.5 (30 Oct 2020 20:00)  HR: 93 (31 Oct 2020 01:00) (85 - 103)  BP: 147/82 (31 Oct 2020 01:00) (111/67 - 152/74)  BP(mean): 94 (31 Oct 2020 01:00) (63 - 105)  ABP: --  ABP(mean): --  RR: 14 (31 Oct 2020 01:00) (13 - 19)  SpO2: 96% (31 Oct 2020 01:00) (93% - 98%)    AAO X 3  PERRLA, EOMI  CN 2-12 grossly intact  PERERA strength 5/5, no drift    MEDICATIONS  (STANDING):  ceFAZolin   IVPB 2000 milliGRAM(s) IV Intermittent every 8 hours  ciprofloxacin/dexamethasone Suspension Otic 5 Drop(s) Left Ear two times a day  influenza   Vaccine 0.5 milliLiter(s) IntraMuscular once  sodium chloride 0.9% lock flush 3 milliLiter(s) IV Push every 8 hours    MEDICATIONS  (PRN):  acetaminophen   Tablet .. 650 milliGRAM(s) Oral every 6 hours PRN Mild Pain (1 - 3), Moderate Pain (4 - 6)  oxyCODONE    IR 5 milliGRAM(s) Oral every 6 hours PRN Severe Pain (7 - 10)                          13.3   14.85 )-----------( 163      ( 31 Oct 2020 00:24 )             40.2     10-31    133<L>  |  99  |  13  ----------------------------<  213<H>  4.2   |  20<L>  |  0.72    Ca    8.4      31 Oct 2020 00:29  Phos  2.6     10-31  Mg     2.0     10-31

## 2020-10-31 NOTE — PROGRESS NOTE ADULT - ASSESSMENT
60 year old male presents with PST with chronic left ear drainage, progressively worsening recent evaluation tested fluid and noted CSF fluid leak, patient reports fullness of left ear with subsequent tubes placed to drain fluid and relieve pressure; denies headaches, weakness, changes in mental status or any other complaint, Denies trauma or head injury. 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    Plan     Neuro:   s/p encephalocele w/ total mastoidectomy  - Q1 Neuro checks  - monitor numbness left hand; improving post removal of A-line  - Lumbar drain, 10cc drained q1h  - Tylenol 975 PRN  - Oxy 5 and 10 for pain  - Headaches post lumbar drainage; volume to be drained dropped from 10 to 5cc    CV  - Normotensive, normocardic  - F/u BP and HR  - Holding home ASA per neurosurg  - d/c a-line    Resp  - Satting well on RA    GI  - Regular diet, tolerating well      - Passed ToV s/p livingston removal    Heme  - Holding pharmaceutical DVT PPx per neurosurg  - SCDs     ID  - Ancef 2 grams q8    Endo   - f/u A1C     Lines  - L Rad A- line- d/c  - PIV x 2      60 year old male presents with PST with chronic left ear drainage, progressively worsening recent evaluation tested fluid and noted CSF fluid leak, patient reports fullness of left ear with subsequent tubes placed to drain fluid and relieve pressure; denies headaches, weakness, changes in mental status or any other complaint, Denies trauma or head injury. 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    Plan     Neuro:   s/p encephalocele w/ total mastoidectomy  - Q1 Neuro checks  - monitor numbness left hand; improving post removal of A-line  - Lumbar drain, 10cc -->2 drained q1h  - Tylenol 975 PRN  - Oxy 5 and 10 for pain  - Headaches post lumbar drainage; volume to be drained dropped from 10 to 5cc    CV  - Normotensive, normocardic  - F/u BP and HR  - Holding home ASA per neurosurg  - d/c a-line    Resp  - Satting well on RA    GI  - Regular diet, tolerating well      - Passed ToV s/p livingston removal    Heme  - Holding pharmaceutical DVT PPx per neurosurg  - SCDs     ID  - Ancef 2 grams q8    Endo   - f/u A1C     Lines  - L Rad A- line- d/c  - PIV x 2

## 2020-10-31 NOTE — PROGRESS NOTE ADULT - SUBJECTIVE AND OBJECTIVE BOX
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  NAEON.     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

## 2020-10-31 NOTE — PROGRESS NOTE ADULT - SUBJECTIVE AND OBJECTIVE BOX
HISTORY  60y male presents with PST with chronic left ear drainage, progressively worsening recent evaluation tested fluid and noted CSF fluid leak, patient reported fullness of left ear with subsequent tubes placed to drain fluid and relieve pressure; denied headaches, weakness, denied trauma or head injury changes in mental status or any other complaint . 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    24 HOUR EVENTS:  - patient ok to ambulate IF lumbar drain clamped and protected  - left haney numbness over all four digits; not neurologic distribution  - d/c a-line per SICU and NSG; left hand numbness improved   - Pt complaining of headaches post 10cc lumbar drainage, drain volume changed to 5cc, held for 1hr    SUBJECTIVE/ROS:  [x] A ten-point review of systems was otherwise negative except as noted.  [ ] Due to altered mental status/intubation, subjective information were not able to be obtained from the patient. History was obtained, to the extent possible, from review of the chart and collateral sources of information.    MEDS  Neurologic Medications  acetaminophen   Tablet .. 650 milliGRAM(s) Oral every 6 hours PRN Mild Pain (1 - 3), Moderate Pain (4 - 6)  oxyCODONE    IR 5 milliGRAM(s) Oral every 6 hours PRN Severe Pain (7 - 10)    Respiratory Medications    Cardiovascular Medications    Gastrointestinal Medications  sodium chloride 0.9% lock flush 3 milliLiter(s) IV Push every 8 hours    Genitourinary Medications    Hematologic/Oncologic Medications  influenza   Vaccine 0.5 milliLiter(s) IntraMuscular once    Antimicrobial/Immunologic Medications  ceFAZolin   IVPB 2000 milliGRAM(s) IV Intermittent every 8 hours    Endocrine/Metabolic Medications    Topical/Other Medications  ciprofloxacin/dexamethasone Suspension Otic 5 Drop(s) Left Ear two times a day    VITALS  T(C): 37.1 (10-31-20 @ 00:00), Max: 37.5 (10-30-20 @ 20:00)  HR: 81 (10-31-20 @ 03:00) (81 - 103)  BP: 139/58 (10-31-20 @ 03:00) (111/67 - 152/74)  BP(mean): 76 (10-31-20 @ 03:00) (58 - 105)  ABP: --  ABP(mean): --  RR: 18 (10-31-20 @ 03:00) (13 - 19)  SpO2: 94% (10-31-20 @ 03:00) (93% - 98%)  Wt(kg): --  CVP(mm Hg): --  CI: --  CAPILLARY BLOOD GLUCOSE       N/A      10-29 @ 07:01  -  10-30 @ 07:00  --------------------------------------------------------  IN:    IV PiggyBack: 50 mL    Oral Fluid: 900 mL  Total IN: 950 mL    OUT:    Drain (mL): 110 mL    Indwelling Catheter - Urethral (mL): 550 mL    Voided (mL): 100 mL  Total OUT: 760 mL    Total NET: 190 mL      10-30 @ 07:01  -  10-31 @ 03:12  --------------------------------------------------------  IN:    IV PiggyBack: 150 mL    Oral Fluid: 1400 mL  Total IN: 1550 mL    OUT:    Drain (mL): 150 mL    Voided (mL): 2500 mL  Total OUT: 2650 mL    Total NET: -1100 mL      EXAM    NEURO  Awake, alert, oriented  Exam: strength intact bilaterally, sensation only decreased over haney surface of left hand only distal to the wrist, improving post removal of A-line  [x] Adequacy of sedation and pain control has been assessed and adjusted    RESPIRATORY  Exam: Lungs clear to auscultation, Normal expansion/effort.      CARDIOVASCULAR  Exam: S1, S2.  Regular rate and rhythm.   Cardiac Rhythm:  Perfusion     [x]Adequate   [ ]Inadequate  Mentation   [X]Normal       [ ]Reduced  Extremities  [x]Warm         [ ]Cool  Volume Status [ ]Hypervolemic [ ]Euvolemic [ ]Hypovolemic    GI/NUTRITION  Exam: soft, nontender, nondistended    GENITOURINARY  I&O's Detail    29 Oct 2020 07:01  -  30 Oct 2020 07:00  --------------------------------------------------------  IN:    IV PiggyBack: 50 mL    Oral Fluid: 900 mL  Total IN: 950 mL    OUT:    Drain (mL): 110 mL    Indwelling Catheter - Urethral (mL): 550 mL    Voided (mL): 100 mL  Total OUT: 760 mL    Total NET: 190 mL      30 Oct 2020 07:01  -  31 Oct 2020 03:13  --------------------------------------------------------  IN:    IV PiggyBack: 150 mL    Oral Fluid: 1550 mL  Total IN: 1700 mL    OUT:    Drain (mL): 150 mL    Voided (mL): 2500 mL  Total OUT: 2650 mL    Total NET: -950 mL    LABS                                            13.3                  Neurophils% (auto):   x      (10-31 @ 00:24):    14.85)-----------(163          Lymphocytes% (auto):  x                                             40.2                   Eosinphils% (auto):   x        Manual%: Neutrophils x    ; Lymphocytes x    ; Eosinophils x    ; Bands%: x    ; Blasts x          10-31    133<L>  |  99  |  13  ----------------------------<  213<H>  4.2   |  20<L>  |  0.72    Ca    8.4      31 Oct 2020 00:29  Phos  2.6     10-31  Mg     2.0     10-31        ABG - ( 29 Oct 2020 16:00 )  pH: 7.30  /  pCO2: 44    /  pO2: 212   / HCO3: 21    / Base Excess: -4.0  /  SaO2: 98.6  / Lactate: x          RECENT CULTURES:      ABG - ( 29 Oct 2020 16:00 )  pH, Arterial: 7.30  pH, Blood: x     /  pCO2: 44    /  pO2: 212   / HCO3: 21    / Base Excess: -4.0  /  SaO2: 98.6

## 2020-10-31 NOTE — PROGRESS NOTE ADULT - ASSESSMENT
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

## 2020-11-01 ENCOUNTER — TRANSCRIPTION ENCOUNTER (OUTPATIENT)
Age: 60
End: 2020-11-01

## 2020-11-01 VITALS
RESPIRATION RATE: 13 BRPM | DIASTOLIC BLOOD PRESSURE: 74 MMHG | OXYGEN SATURATION: 97 % | SYSTOLIC BLOOD PRESSURE: 127 MMHG | TEMPERATURE: 99 F | HEART RATE: 93 BPM

## 2020-11-01 LAB
ANION GAP SERPL CALC-SCNC: 12 MMO/L — SIGNIFICANT CHANGE UP (ref 7–14)
BUN SERPL-MCNC: 12 MG/DL — SIGNIFICANT CHANGE UP (ref 7–23)
CALCIUM SERPL-MCNC: 9 MG/DL — SIGNIFICANT CHANGE UP (ref 8.4–10.5)
CHLORIDE SERPL-SCNC: 99 MMOL/L — SIGNIFICANT CHANGE UP (ref 98–107)
CO2 SERPL-SCNC: 22 MMOL/L — SIGNIFICANT CHANGE UP (ref 22–31)
CREAT SERPL-MCNC: 0.7 MG/DL — SIGNIFICANT CHANGE UP (ref 0.5–1.3)
GLUCOSE SERPL-MCNC: 177 MG/DL — HIGH (ref 70–99)
HCT VFR BLD CALC: 40.7 % — SIGNIFICANT CHANGE UP (ref 39–50)
HGB BLD-MCNC: 13.9 G/DL — SIGNIFICANT CHANGE UP (ref 13–17)
MAGNESIUM SERPL-MCNC: 2.1 MG/DL — SIGNIFICANT CHANGE UP (ref 1.6–2.6)
MCHC RBC-ENTMCNC: 29.3 PG — SIGNIFICANT CHANGE UP (ref 27–34)
MCHC RBC-ENTMCNC: 34.2 % — SIGNIFICANT CHANGE UP (ref 32–36)
MCV RBC AUTO: 85.9 FL — SIGNIFICANT CHANGE UP (ref 80–100)
NRBC # FLD: 0 K/UL — SIGNIFICANT CHANGE UP (ref 0–0)
PHOSPHATE SERPL-MCNC: 2.9 MG/DL — SIGNIFICANT CHANGE UP (ref 2.5–4.5)
PLATELET # BLD AUTO: 140 K/UL — LOW (ref 150–400)
PMV BLD: 9.2 FL — SIGNIFICANT CHANGE UP (ref 7–13)
POTASSIUM SERPL-MCNC: 4.2 MMOL/L — SIGNIFICANT CHANGE UP (ref 3.5–5.3)
POTASSIUM SERPL-SCNC: 4.2 MMOL/L — SIGNIFICANT CHANGE UP (ref 3.5–5.3)
RBC # BLD: 4.74 M/UL — SIGNIFICANT CHANGE UP (ref 4.2–5.8)
RBC # FLD: 13 % — SIGNIFICANT CHANGE UP (ref 10.3–14.5)
SODIUM SERPL-SCNC: 133 MMOL/L — LOW (ref 135–145)
WBC # BLD: 14.54 K/UL — HIGH (ref 3.8–10.5)
WBC # FLD AUTO: 14.54 K/UL — HIGH (ref 3.8–10.5)

## 2020-11-01 PROCEDURE — 99232 SBSQ HOSP IP/OBS MODERATE 35: CPT

## 2020-11-01 RX ORDER — CIPROFLOXACIN AND DEXAMETHASONE 3; 1 MG/ML; MG/ML
5 SUSPENSION/ DROPS AURICULAR (OTIC)
Qty: 1 | Refills: 0
Start: 2020-11-01 | End: 2020-11-04

## 2020-11-01 RX ORDER — ACETAMINOPHEN 500 MG
2 TABLET ORAL
Qty: 0 | Refills: 0 | DISCHARGE
Start: 2020-11-01

## 2020-11-01 RX ORDER — OXYCODONE HYDROCHLORIDE 5 MG/1
1 TABLET ORAL
Qty: 12 | Refills: 0
Start: 2020-11-01 | End: 2020-11-03

## 2020-11-01 RX ORDER — ASPIRIN/CALCIUM CARB/MAGNESIUM 324 MG
1 TABLET ORAL
Qty: 0 | Refills: 0 | DISCHARGE

## 2020-11-01 RX ADMIN — OXYCODONE HYDROCHLORIDE 5 MILLIGRAM(S): 5 TABLET ORAL at 01:13

## 2020-11-01 RX ADMIN — Medication 650 MILLIGRAM(S): at 14:49

## 2020-11-01 RX ADMIN — OXYCODONE HYDROCHLORIDE 5 MILLIGRAM(S): 5 TABLET ORAL at 00:47

## 2020-11-01 RX ADMIN — Medication 100 MILLIGRAM(S): at 05:09

## 2020-11-01 RX ADMIN — SODIUM CHLORIDE 3 MILLILITER(S): 9 INJECTION INTRAMUSCULAR; INTRAVENOUS; SUBCUTANEOUS at 05:10

## 2020-11-01 RX ADMIN — CIPROFLOXACIN AND DEXAMETHASONE 5 DROP(S): 3; 1 SUSPENSION/ DROPS AURICULAR (OTIC) at 18:18

## 2020-11-01 RX ADMIN — SODIUM CHLORIDE 3 MILLILITER(S): 9 INJECTION INTRAMUSCULAR; INTRAVENOUS; SUBCUTANEOUS at 14:50

## 2020-11-01 RX ADMIN — Medication 100 MILLIGRAM(S): at 14:18

## 2020-11-01 RX ADMIN — CIPROFLOXACIN AND DEXAMETHASONE 5 DROP(S): 3; 1 SUSPENSION/ DROPS AURICULAR (OTIC) at 05:10

## 2020-11-01 RX ADMIN — OXYCODONE HYDROCHLORIDE 5 MILLIGRAM(S): 5 TABLET ORAL at 08:10

## 2020-11-01 RX ADMIN — Medication 650 MILLIGRAM(S): at 14:19

## 2020-11-01 RX ADMIN — OXYCODONE HYDROCHLORIDE 5 MILLIGRAM(S): 5 TABLET ORAL at 07:40

## 2020-11-01 NOTE — PROGRESS NOTE ADULT - ASSESSMENT
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

## 2020-11-01 NOTE — DISCHARGE NOTE PROVIDER - NSDCMRMEDTOKEN_GEN_ALL_CORE_FT
acetaminophen 325 mg oral tablet: 2 tab(s) orally every 6 hours, As needed, Mild Pain (1 - 3), Moderate Pain (4 - 6)  ciprofloxacin-dexamethasone 0.3%-0.1% otic suspension: 5 drop(s) to left  ear 2 times a day  oxyCODONE 5 mg oral tablet: 1 tab(s) orally every 6 hours, As needed, Severe Pain (7 - 10) MDD:4 tabs   acetaminophen 325 mg oral tablet: 2 tab(s) orally every 6 hours, As needed, Mild Pain (1 - 3), Moderate Pain (4 - 6)  ciprofloxacin-dexamethasone 0.3%-0.1% otic suspension: 5 drop(s) to left  ear 2 times a day

## 2020-11-01 NOTE — PROGRESS NOTE ADULT - ATTENDING COMMENTS
doing well postop, incision c/d/i, ear canal dry, b/l facial function normal; continue LD per neurosurgery.
Patient with no acute events overnight. Lumbar drain planned to be d/c;'ed today. Plan for possible d/c today vs floor transfer    I have personally interviewed when possible and examined the patient, reviewed data and laboratory tests/x-rays and all pertinent electronic images.  I was physically present for the key portions of the evaluation and management (E/M) service provided.   The SICU team has a constant risk benefit analyzes discussion with the primary team, all consultants, House Staff and PA's on all decisions.  These diagnoses are unrelated to the surgical procedure noted above.  I meet with family if needed to get further history, discuss the case and make care decisions for this patient who might not be able to participate.  Time involved in performance of separately billable procedures was not counted toward my critical care time. There is no overlap.  I spent 30 minutes ( 0800Hrs-0915Hrs in AM/ 1600Hrs-1715Hrs in PM, or other time indicated) of critical care time for the diagnoses, assessment, plan and interventions.  This time excludes time spent on separate procedures and teaching.
Patient s/p crani, csf leak repair and lumbar drain. Patient neurologically intact, ambulating. Plan scd for vte ppx, home medications, reg diet, lumbar drain until monday per nsx    I have personally interviewed when possible and examined the patient, reviewed data and laboratory tests/x-rays and all pertinent electronic images.  I was physically present for the key portions of the evaluation and management (E/M) service provided.   The SICU team has a constant risk benefit analyzes discussion with the primary team, all consultants, House Staff and PA's on all decisions.  These diagnoses are unrelated to the surgical procedure noted above.  I meet with family if needed to get further history, discuss the case and make care decisions for this patient who might not be able to participate.  Time involved in performance of separately billable procedures was not counted toward my critical care time. There is no overlap.  I spent 30 minutes ( 0800Hrs-0915Hrs in AM/ 1600Hrs-1715Hrs in PM, or other time indicated) of critical care time for the diagnoses, assessment, plan and interventions.  This time excludes time spent on separate procedures and teaching.
Patient with no events overnight. Patient requiring sicu 2/2 to lumbar drain and neurochecks. No changes in management from previous day.    I have personally interviewed when possible and examined the patient, reviewed data and laboratory tests/x-rays and all pertinent electronic images.  I was physically present for the key portions of the evaluation and management (E/M) service provided.   The SICU team has a constant risk benefit analyzes discussion with the primary team, all consultants, House Staff and PA's on all decisions.  These diagnoses are unrelated to the surgical procedure noted above.  I meet with family if needed to get further history, discuss the case and make care decisions for this patient who might not be able to participate.  Time involved in performance of separately billable procedures was not counted toward my critical care time. There is no overlap.  I spent 30 minutes ( 0800Hrs-0915Hrs in AM/ 1600Hrs-1715Hrs in PM, or other time indicated) of critical care time for the diagnoses, assessment, plan and interventions.  This time excludes time spent on separate procedures and teaching.
doing well, ld 10cc/hr, no leaking, pain controlled, sicu care, ld till monday than dispo planning

## 2020-11-01 NOTE — PROGRESS NOTE ADULT - SUBJECTIVE AND OBJECTIVE BOX
Headaches greatly improved with decreased lumbar CSF drainage  ICU Vital Signs Last 24 Hrs  T(C): 37.1 (01 Nov 2020 00:00), Max: 37.9 (31 Oct 2020 20:00)  T(F): 98.7 (01 Nov 2020 00:00), Max: 100.2 (31 Oct 2020 20:00)  HR: 75 (01 Nov 2020 01:00) (75 - 110)  BP: 139/77 (01 Nov 2020 01:00) (131/38 - 153/82)  BP(mean): 91 (01 Nov 2020 01:00) (58 - 101)  ABP: --  ABP(mean): --  RR: 15 (01 Nov 2020 01:00) (11 - 23)  SpO2: 95% (01 Nov 2020 01:00) (93% - 99%)    AAO X 3  PERRLA, EOMI  CN 2-12 grossly intact  PERERA strength 5/5, no drift  SILT    MEDICATIONS  (STANDING):  ceFAZolin   IVPB 2000 milliGRAM(s) IV Intermittent every 8 hours  ciprofloxacin/dexamethasone Suspension Otic 5 Drop(s) Left Ear two times a day  influenza   Vaccine 0.5 milliLiter(s) IntraMuscular once  sodium chloride 0.9% lock flush 3 milliLiter(s) IV Push every 8 hours    MEDICATIONS  (PRN):  acetaminophen   Tablet .. 650 milliGRAM(s) Oral every 6 hours PRN Mild Pain (1 - 3), Moderate Pain (4 - 6)  oxyCODONE    IR 5 milliGRAM(s) Oral every 6 hours PRN Severe Pain (7 - 10)                          13.9   14.54 )-----------( 140      ( 01 Nov 2020 00:30 )             40.7     10-31    133<L>  |  99  |  13  ----------------------------<  213<H>  4.2   |  20<L>  |  0.72    Ca    8.4      31 Oct 2020 00:29  Phos  2.6     10-31  Mg     2.0     10-31

## 2020-11-01 NOTE — DISCHARGE NOTE NURSING/CASE MANAGEMENT/SOCIAL WORK - PATIENT PORTAL LINK FT
You can access the FollowMyHealth Patient Portal offered by [facility] by registering at the following website: [email or web address]. By joining [employer]’s FollowMyHealth portal, you will also be able to view your health information using other applications (apps) compatible with our system.

## 2020-11-01 NOTE — PROGRESS NOTE ADULT - ASSESSMENT
60 year old male presents with PST with chronic left ear drainage, progressively worsening recent evaluation tested fluid and noted CSF fluid leak, patient reports fullness of left ear with subsequent tubes placed to drain fluid and relieve pressure; denies headaches, weakness, changes in mental status or any other complaint, Denies trauma or head injury. 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    Plan     Neuro:   s/p encephalocele w/ total mastoidectomy  - Q4 Neuro checks  - monitor numbness left hand; improving post removal of A-line  - Lumbar drain, 10cc -->2 drained q1h  - Tylenol 975 PRN  - Oxy 5 and 10 for pain  - Headaches post lumbar drainage; volume to be drained dropped from 10 to 5cc to 2cc  - Plan fro lumbar drain removal 11/1 per Neurosurg    CV  - Normotensive, normocardic  - F/u BP and HR  - Holding home ASA per neurosurg  - d/c a-line    Resp  - Satting well on RA    GI  - Regular diet, tolerating well      - Passed ToV s/p livingston removal    Heme  - Holding pharmaceutical DVT PPx per neurosurg  - SCDs     ID  - Ancef 2 grams q8    Endo   - f/u A1C     Lines  - L Rad A- line- d/c  - PIV x 2

## 2020-11-01 NOTE — DISCHARGE NOTE PROVIDER - HOSPITAL COURSE
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    10/29 patient underwent Repair of skull base, Left craniotomy transmastoidal encephalocele/ CSF leak repair, LD x 3 day.  LD initially draining 10cc/hr decreased to 5cc and subsequently decreased to 2cc/hr for headaches.  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    10/29 patient underwent Repair of skull base, Left craniotomy transmastoidal encephalocele/ CSF leak repair, LD x 3 day.  LD initially draining 10cc/hr decreased to 5cc and subsequently decreased to 2cc/hr for headaches.  11/01 LD discontinued    patient stable for discharge to home

## 2020-11-01 NOTE — DISCHARGE NOTE PROVIDER - NSDCFUADDINST_GEN_ALL_CORE_FT
Do not drive while taking narcotics or pain medications.   Do not lift more than 5-10 lbs.   You may shower or shampoo your incision 4 days after surgery. You can let soap and water run on it and pat it dry.   Keep incision clean and dry and do not use any ointments.   Report to the ED for persistent fever, vomiting, headaches not relieved by medications, drainage from the incision or any change in neurologic or mental status or seizures.   Call the office to make a follow up appointment

## 2020-11-01 NOTE — PROGRESS NOTE ADULT - PROBLEM SELECTOR PLAN 1
Lumbar drain 2cc/Hour, hold drainage X 1 hour  Lumbar drain to be removed later today  monitor for CSF leak

## 2020-11-01 NOTE — DISCHARGE NOTE PROVIDER - NSDCCPCAREPLAN_GEN_ALL_CORE_FT
PRINCIPAL DISCHARGE DIAGNOSIS  Diagnosis: S/P craniotomy  Assessment and Plan of Treatment: S/P craniotomy

## 2020-11-01 NOTE — PROGRESS NOTE ADULT - SUBJECTIVE AND OBJECTIVE BOX
HISTORY  60y male presents with PST with chronic left ear drainage, progressively worsening recent evaluation tested fluid and noted CSF fluid leak, patient reported fullness of left ear with subsequent tubes placed to drain fluid and relieve pressure; denied headaches, weakness, denied trauma or head injury changes in mental status or any other complaint . 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    24 HOUR EVENTS:    24 HOUR EVENTS:  - No acute events overnight  - Holding DVT PPx  - Neurosurg plan for lumbar drain to be removed 11/1  - Patient switched from Q1 to Q4 neurochecks    SUBJECTIVE/ROS:  [x] A ten-point review of systems was otherwise negative except as noted.  [ ] Due to altered mental status/intubation, subjective information were not able to be obtained from the patient. History was obtained, to the extent possible, from review of the chart and collateral sources of information.    MEDS  Neurologic Medications  acetaminophen   Tablet .. 650 milliGRAM(s) Oral every 6 hours PRN Mild Pain (1 - 3), Moderate Pain (4 - 6)  oxyCODONE    IR 5 milliGRAM(s) Oral every 6 hours PRN Severe Pain (7 - 10)    Respiratory Medications    Cardiovascular Medications    Gastrointestinal Medications  sodium chloride 0.9% lock flush 3 milliLiter(s) IV Push every 8 hours    Genitourinary Medications    Hematologic/Oncologic Medications  influenza   Vaccine 0.5 milliLiter(s) IntraMuscular once    Antimicrobial/Immunologic Medications  ceFAZolin   IVPB 2000 milliGRAM(s) IV Intermittent every 8 hours    Endocrine/Metabolic Medications    Topical/Other Medications  ciprofloxacin/dexamethasone Suspension Otic 5 Drop(s) Left Ear two times a day    VITALS  T(C): 37.2 (11-01-20 @ 04:00), Max: 37.9 (10-31-20 @ 20:00)  HR: 74 (11-01-20 @ 04:00) (74 - 110)  BP: 156/92 (11-01-20 @ 04:00) (139/77 - 156/92)  BP(mean): 104 (11-01-20 @ 04:00) (74 - 104)  ABP: --  ABP(mean): --  RR: 12 (11-01-20 @ 04:00) (11 - 23)  SpO2: 94% (11-01-20 @ 04:00) (93% - 99%)  Wt(kg): --  CVP(mm Hg): --  CI: --  CAPILLARY BLOOD GLUCOSE       N/A      10-30 @ 07:01  -  10-31 @ 07:00  --------------------------------------------------------  IN:    IV PiggyBack: 200 mL    Oral Fluid: 1800 mL  Total IN: 2000 mL    OUT:    Drain (mL): 158 mL    Voided (mL): 3050 mL  Total OUT: 3208 mL    Total NET: -1208 mL      10-31 @ 08:01  -  11-01 @ 05:15  --------------------------------------------------------  IN:    IV PiggyBack: 50 mL    Oral Fluid: 840 mL  Total IN: 890 mL    OUT:    Drain (mL): 42 mL    Voided (mL): 2620 mL  Total OUT: 2662 mL    Total NET: -1772 mL    EXAM    NEURO  Awake, alert, oriented  Exam: strength intact bilaterally, sensation only decreased over haney surface of left hand only distal to the wrist, improving post removal of A-line  [x] Adequacy of sedation and pain control has been assessed and adjusted    RESPIRATORY  Exam: Lungs clear to auscultation, Normal expansion/effort.      CARDIOVASCULAR  Exam: S1, S2.  Regular rate and rhythm.   Cardiac Rhythm:  Perfusion     [x]Adequate   [ ]Inadequate  Mentation   [X]Normal       [ ]Reduced  Extremities  [x]Warm         [ ]Cool  Volume Status [ ]Hypervolemic [ ]Euvolemic [ ]Hypovolemic    GI/NUTRITION  Exam: soft, nontender, nondistended    GENITOURINARY  T(C): 37.2 (11-01-20 @ 04:00), Max: 37.9 (10-31-20 @ 20:00)  HR: 74 (11-01-20 @ 04:00) (74 - 110)  BP: 156/92 (11-01-20 @ 04:00) (139/77 - 156/92)  BP(mean): 104 (11-01-20 @ 04:00) (74 - 104)  ABP: --  ABP(mean): --  RR: 12 (11-01-20 @ 04:00) (11 - 23)  SpO2: 94% (11-01-20 @ 04:00) (93% - 99%)  Wt(kg): --  CVP(mm Hg): --  CI: --  CAPILLARY BLOOD GLUCOSE       N/A      10-30 @ 07:01  -  10-31 @ 07:00  --------------------------------------------------------  IN:    IV PiggyBack: 200 mL    Oral Fluid: 1800 mL  Total IN: 2000 mL    OUT:    Drain (mL): 158 mL    Voided (mL): 3050 mL  Total OUT: 3208 mL    Total NET: -1208 mL      10-31 @ 08:01  -  11-01 @ 05:16  --------------------------------------------------------  IN:    IV PiggyBack: 50 mL    Oral Fluid: 840 mL  Total IN: 890 mL    OUT:    Drain (mL): 42 mL    Voided (mL): 2620 mL  Total OUT: 2662 mL    Total NET: -1772 mL      LABS                                            13.9                  Neurophils% (auto):   x      (11-01 @ 00:30):    14.54)-----------(140          Lymphocytes% (auto):  x                                             40.7                   Eosinphils% (auto):   x        Manual%: Neutrophils x    ; Lymphocytes x    ; Eosinophils x    ; Bands%: x    ; Blasts x          11-01    133<L>  |  99  |  12  ----------------------------<  177<H>  4.2   |  22  |  0.70    Ca    9.0      01 Nov 2020 00:25  Phos  2.9     11-01  Mg     2.1     11-01            RECENT CULTURES:

## 2020-11-01 NOTE — PROGRESS NOTE ADULT - SUBJECTIVE AND OBJECTIVE BOX
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  NAEON.     Vital Signs Last 24 Hrs  T(C): 37.1 (01 Nov 2020 00:00), Max: 37.9 (31 Oct 2020 20:00)  T(F): 98.7 (01 Nov 2020 00:00), Max: 100.2 (31 Oct 2020 20:00)  HR: 86 (01 Nov 2020 00:00) (77 - 110)  BP: 142/64 (01 Nov 2020 00:00) (131/38 - 153/82)  BP(mean): 82 (01 Nov 2020 00:00) (58 - 101)  RR: 15 (01 Nov 2020 00:00) (11 - 23)  SpO2: 95% (01 Nov 2020 00:00) (93% - 99%)    Physical Exam:  Alert, NAD  incision c/d/i, soft, no oozing, no hematoma  CN II-XII intact  L ear packed with gel foam, not draining or bleeding  Nonlabored Respirations RA      A/P: 59yo s/p mastoidectomy, MCF approach to repair of tegmen defect for CSF leak and encephalocele 10/30  - hold ASA per NSGY  - LD per NSGY  - continue ancef while drainin place  - cipro gtt to L ear for 7 days  - reg diet  - SCDs  - d/w attending

## 2020-11-01 NOTE — DISCHARGE NOTE PROVIDER - CARE PROVIDER_API CALL
Dwayne Garza  PEDIATRICS NEUROSURGERY  [address], [facility]  [place] [address]  Phone: [phone]  Fax: [phone]  Follow Up Time: 1 week

## 2020-11-09 PROCEDURE — 70450 CT HEAD/BRAIN W/O DYE: CPT | Mod: 26,GC

## 2020-11-12 PROBLEM — H66.3X9: Chronic | Status: ACTIVE | Noted: 2020-10-23

## 2020-11-12 PROBLEM — I44.7 LEFT BUNDLE-BRANCH BLOCK, UNSPECIFIED: Chronic | Status: ACTIVE | Noted: 2020-10-23

## 2020-11-12 PROBLEM — G96.00 CEREBROSPINAL FLUID LEAK, UNSPECIFIED: Chronic | Status: ACTIVE | Noted: 2020-10-23

## 2020-11-12 PROBLEM — H92.12 OTORRHEA, LEFT EAR: Chronic | Status: ACTIVE | Noted: 2020-10-23

## 2020-11-25 ENCOUNTER — APPOINTMENT (OUTPATIENT)
Dept: OTOLARYNGOLOGY | Facility: CLINIC | Age: 60
End: 2020-11-25
Payer: COMMERCIAL

## 2020-11-25 VITALS
BODY MASS INDEX: 34.46 KG/M2 | DIASTOLIC BLOOD PRESSURE: 78 MMHG | TEMPERATURE: 98.4 F | SYSTOLIC BLOOD PRESSURE: 120 MMHG | HEART RATE: 95 BPM | WEIGHT: 260 LBS | HEIGHT: 73 IN

## 2020-11-25 PROCEDURE — 99024 POSTOP FOLLOW-UP VISIT: CPT

## 2020-11-26 NOTE — HISTORY OF PRESENT ILLNESS
[de-identified] : pain well controlled, no ear drainage, no rhinorrhea, no dizziness.  Left ear feels clogged, but experiencing crackling left ear.

## 2020-11-26 NOTE — CONSULT LETTER
[FreeTextEntry2] : Flako Cardona MD [FreeTextEntry1] : Dear Flako,\par \par Mr. Gilligan presents for followup for his left CSF otorrhea.  He is now 3 weeks status post left transmastoid/middle fossa CSF leak repair.  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  Since surgery he has been doing well without significant pain, ear drainage, or rhinorrhea.  Exam today shows a well healed postauricular/scalp incision and intact tympanic membrane.  So far he is doing well since surgery, and I will see him back in 1 month.\par \par Thank you once again for the opportunity to participate in your patient's care, and I will keep you informed as to his progress.\par \par Best regards,\par \par Jai Carlson MD\par Otology/Neurotology\par [facility]\par [facility]\par

## 2020-11-26 NOTE — PHYSICAL EXAM
[de-identified] : postauricular and scalp incision well healed [de-identified] : AS:  TM intact, thickened, blood products medially, Valladares to left, Rinne negative on left [Normal] : no rashes

## 2020-12-23 ENCOUNTER — APPOINTMENT (OUTPATIENT)
Dept: OTOLARYNGOLOGY | Facility: CLINIC | Age: 60
End: 2020-12-23
Payer: COMMERCIAL

## 2020-12-23 VITALS — BODY MASS INDEX: 34.46 KG/M2 | WEIGHT: 260 LBS | HEIGHT: 73 IN | TEMPERATURE: 98.2 F

## 2020-12-23 PROCEDURE — 99024 POSTOP FOLLOW-UP VISIT: CPT

## 2020-12-23 RX ORDER — PREDNISONE 10 MG/1
10 TABLET ORAL
Qty: 30 | Refills: 0 | Status: ACTIVE | COMMUNITY
Start: 2020-12-23 | End: 1900-01-01

## 2020-12-23 RX ORDER — FLUTICASONE PROPIONATE 50 UG/1
50 SPRAY, METERED NASAL DAILY
Qty: 1 | Refills: 3 | Status: ACTIVE | COMMUNITY
Start: 2020-12-23 | End: 1900-01-01

## 2020-12-23 NOTE — CONSULT LETTER
[FreeTextEntry2] : Flako Cardona MD [FreeTextEntry1] : Dear Flako,\par \par Michael Gilligan presents for follow-up 7 weeks status post left combined CSF leak repair.  Since his last visit, his hearing loss has been slowly improving and he is getting popping sensations in the left ear.  Exam today shows an intact thickened left tympanic membrane with dilma-colored serous effusion, and healed left scalp incision.  Rinne shows air conduction greater than bone conduction in the left ear. \par \par I believe the dilma-colored effusion is most likely reflective of serous otitis media from longstanding eustachian tube dysfunction rather than residual CSF leak.  We plan to treat him with a prednisone taper and Flonase, and I will see him back in 1 month.  I may consider myringotomy if the effusion does not resolve by his next visit.\par \par Thank you once again for the opportunity to participate in your patient's care, and I will keep you informed as to his progress.\par \par Best regards,\par \par Jai Carlson MD\par Otology/Neurotology\par [facility]\par [facility]

## 2020-12-23 NOTE — HISTORY OF PRESENT ILLNESS
[de-identified] : Reports some improvement in hearing loss with popping, still with clogged sensation.

## 2020-12-23 NOTE — PHYSICAL EXAM
[Normal] : no rashes [de-identified] : Ear canal dry, left scalp and postauricular incision well-healed without swelling [de-identified] :  right TM normal, left TM intact with dilma-colored serous effusion, thickened TM; Rinne air conduction greater than bone conduction left ear [FreeTextEntry2] : Facial nerve function is House Brackmann 1/6 in right ear and 1/6 in left ear.

## 2020-12-23 NOTE — REASON FOR VISIT
[Post-Operative Visit] : a post-operative visit [Hearing Loss] : hearing loss [FreeTextEntry2] : left ear

## 2021-01-14 RX ORDER — FLUTICASONE PROPIONATE 50 UG/1
50 SPRAY, METERED NASAL
Qty: 1 | Refills: 3 | Status: ACTIVE | COMMUNITY
Start: 2021-01-14 | End: 1900-01-01

## 2021-01-25 ENCOUNTER — APPOINTMENT (OUTPATIENT)
Dept: OTOLARYNGOLOGY | Facility: CLINIC | Age: 61
End: 2021-01-25
Payer: COMMERCIAL

## 2021-01-25 VITALS
SYSTOLIC BLOOD PRESSURE: 154 MMHG | TEMPERATURE: 97.9 F | BODY MASS INDEX: 34.46 KG/M2 | HEIGHT: 73 IN | DIASTOLIC BLOOD PRESSURE: 82 MMHG | WEIGHT: 260 LBS

## 2021-01-25 PROCEDURE — 99024 POSTOP FOLLOW-UP VISIT: CPT

## 2021-01-25 PROCEDURE — 69433 CREATE EARDRUM OPENING: CPT | Mod: LT,58

## 2021-01-25 RX ORDER — OFLOXACIN OTIC 3 MG/ML
0.3 SOLUTION AURICULAR (OTIC)
Qty: 1 | Refills: 1 | Status: ACTIVE | COMMUNITY
Start: 2021-01-25 | End: 1900-01-01

## 2021-01-25 RX ORDER — PREDNISOLONE ACETATE 10 MG/ML
1 SUSPENSION/ DROPS OPHTHALMIC
Qty: 1 | Refills: 1 | Status: ACTIVE | COMMUNITY
Start: 2021-01-25 | End: 1900-01-01

## 2021-01-25 NOTE — PROCEDURE
[FreeTextEntry3] : Procedure note: Left myringotomy and tube insertion\par \par Description of Operative Procedure:  Risks, benefits, and alternatives of the planned procedure were discussed with the patient prior to proceeding.  Risks would include but are not limited to bleeding, infection, persistent drainage, persistent perforation, or failure to improve hearing.  Benefits would include improvement in hearing.  Topical anesthetic was used to anesthetize the tympanic membrane.  A myringotomy was made.  Serous fluid was suctioned.  An ear tube was placed followed by antibiotic drops.  The patient tolerated the procedure without complications.\par

## 2021-01-25 NOTE — CONSULT LETTER
[FreeTextEntry2] : Flako Cardona MD [FreeTextEntry1] : Dear Flako,\par \par Mr. Gilligan presents for his 3-month postoperative visit following left CSF leak repair.  Since his last visit, he reports persistent left ear clogged sensation.  Exam today shows dilma-colored left serous otitis media.  I believe the yellowish fluid in his ear is most likely secondary to longstanding problems with eustachian tube dysfunction rather than recurrent CSF leak, as these problems frequently coexist.  We did perform myringotomy with tube placement as well as sample the fluid today for beta-2 transferrin, but as previously noted this was yellowish in color and not concerning for CSF.  I recommended a course of Floxin/Pred forte drops for the next week, and I will see him back in 3 weeks.\par \par Thank you once again for the opportunity to participate in your patient's care, and I will keep you informed as to his progress.\par \par Best regards,\par \par Jai Carlson MD\par Otology/Neurotology\par [facility]\par [facility]

## 2021-01-28 LAB — BETA-2 TRANSFERRIN: NEGATIVE

## 2021-01-29 ENCOUNTER — NON-APPOINTMENT (OUTPATIENT)
Age: 61
End: 2021-01-29

## 2021-02-17 ENCOUNTER — APPOINTMENT (OUTPATIENT)
Dept: OTOLARYNGOLOGY | Facility: CLINIC | Age: 61
End: 2021-02-17
Payer: COMMERCIAL

## 2021-02-17 VITALS
TEMPERATURE: 97.5 F | WEIGHT: 260 LBS | HEIGHT: 73 IN | SYSTOLIC BLOOD PRESSURE: 125 MMHG | HEART RATE: 94 BPM | DIASTOLIC BLOOD PRESSURE: 85 MMHG | BODY MASS INDEX: 34.46 KG/M2

## 2021-02-17 PROCEDURE — 69210 REMOVE IMPACTED EAR WAX UNI: CPT

## 2021-02-17 PROCEDURE — 99072 ADDL SUPL MATRL&STAF TM PHE: CPT

## 2021-02-17 PROCEDURE — 99213 OFFICE O/P EST LOW 20 MIN: CPT | Mod: 25

## 2021-02-17 NOTE — CONSULT LETTER
[FreeTextEntry2] : Flako Cardona MD [FreeTextEntry1] : Dear Flako,\par \par Michael Gilligan presents for follow-up for his left serous otitis media.  He is now more than 3 months status post left otogenic CSF leak repair.  Since tube placement at his last visit, he has had no new drainage from the ear and his hearing has improved, and sampling of the fluid from his ear at the last visit was negative for beta-2 transferrin..  Exam today shows a dry patent left ear tube.  He is pleased with the absence of drainage from his ear and hearing improvement, but I did  him that he still likely to be prone to recurring eustachian tube problems.  I plan to continue monitoring him closely, and we will see him back in 6 weeks with a postoperative hearing test.  I also provided him a referral to ophthalmology to confirm the absence of papilledema, since as you know some cases of otogenic CSF leak are associated with intracranial hypertension.\par \par Thank you once again for the opportunity to participate in your patient's care, and I will keep you informed as to his progress.\par \par Best regards,\par \par Jai Carlson MD\par Otology/Neurotology\par [facility]\par [facility]

## 2021-04-05 ENCOUNTER — APPOINTMENT (OUTPATIENT)
Dept: OTOLARYNGOLOGY | Facility: CLINIC | Age: 61
End: 2021-04-05

## 2021-06-28 ENCOUNTER — APPOINTMENT (OUTPATIENT)
Dept: OTOLARYNGOLOGY | Facility: CLINIC | Age: 61
End: 2021-06-28
Payer: COMMERCIAL

## 2021-06-28 VITALS
SYSTOLIC BLOOD PRESSURE: 134 MMHG | DIASTOLIC BLOOD PRESSURE: 85 MMHG | HEART RATE: 80 BPM | TEMPERATURE: 97.9 F | BODY MASS INDEX: 34.46 KG/M2 | HEIGHT: 73 IN | WEIGHT: 260 LBS

## 2021-06-28 PROCEDURE — 92567 TYMPANOMETRY: CPT

## 2021-06-28 PROCEDURE — 99213 OFFICE O/P EST LOW 20 MIN: CPT | Mod: 25

## 2021-06-28 PROCEDURE — 92504 EAR MICROSCOPY EXAMINATION: CPT

## 2021-06-28 PROCEDURE — 92557 COMPREHENSIVE HEARING TEST: CPT

## 2021-06-28 NOTE — HISTORY OF PRESENT ILLNESS
[de-identified] : 60M presents today for Left ear clogged feeling, ETD\par had followed up with ophthalmology and told he had no issues, normal pressure\par states that the Left PE tube fell out about two weeks ago\par no associated drainage at that time, but he now feels pressure \par states that the hearing may be decreased as well

## 2021-06-28 NOTE — DATA REVIEWED
[de-identified] : Type A tymp in Right, type C in Left\par Results obtained via insert earphones revealed:\par Right: -8khz\par Left: Borderline WNL / MILD -8khz\par recs: 1) ENT f/u 2) re-eval as per md

## 2021-06-28 NOTE — CONSULT LETTER
[FreeTextEntry2] : Flako Cardona MD [FreeTextEntry1] : Dear Flako,\par \par Michael Gilligan presents for follow-up 7 months status post left transmastoid/middle fossa CSF leak repair.  Since his last visit, the previously placed ear tube has extruded.  He reports mild pressure in the left ear but overall his hearing remains improved.  Otoscopic exam today shows an intact left tympanic membrane without evidence of effusion medially.  There is a minor central retraction.  The right ear appears normal.  I personally ordered and reviewed an audiogram for the patient's abnormal auditory perception, which shows normal hearing in the right ear and borderline normal hearing with small air-bone gap in the left ear with type C tympanogram, which represents an improvement from his preoperative audiogram.\par \par He appears to be doing well since surgery.  He has seen ophthalmology since his last visit who reported no concerns for papilledema.  I offered him the option of a repeat left ear tube insertion for the pressure in his ear, but for the time being he would like to avoid tube placement.  I will see him back in 6 months.\par \par Thank you once again for the opportunity to participate in your patient's care, and I will keep you informed as to his progress.\par \par Best regards,\par \par Jai Carlson MD\par Otology/Neurotology\par [facility]\par [facility]

## 2021-06-28 NOTE — REASON FOR VISIT
[Subsequent Evaluation] : a subsequent evaluation for [FreeTextEntry2] : Post surgery check up, fluid and clogged ear(left)

## 2021-06-28 NOTE — REVIEW OF SYSTEMS
[Sneezing] : sneezing [Hearing Loss] : hearing loss [Ear Noises] : ear noises [Problem Snoring] : problem snoring [Negative] : Heme/Lymph

## 2021-06-28 NOTE — PHYSICAL EXAM
[Binocular Microscopic Exam] : Binocular microscopic exam was performed [Normal] : no rashes [Hearing Loss Right Only] : normal [Hearing Loss Left Only] : normal [de-identified] : retracted, possible effusion

## 2021-12-15 ENCOUNTER — OUTPATIENT (OUTPATIENT)
Dept: OUTPATIENT SERVICES | Facility: HOSPITAL | Age: 61
LOS: 1 days | End: 2021-12-15
Payer: COMMERCIAL

## 2021-12-15 DIAGNOSIS — Z98.52 VASECTOMY STATUS: Chronic | ICD-10-CM

## 2021-12-15 DIAGNOSIS — I44.7 LEFT BUNDLE-BRANCH BLOCK, UNSPECIFIED: ICD-10-CM

## 2021-12-15 DIAGNOSIS — I35.9 NONRHEUMATIC AORTIC VALVE DISORDER, UNSPECIFIED: ICD-10-CM

## 2021-12-15 DIAGNOSIS — Z86.69 PERSONAL HISTORY OF OTHER DISEASES OF THE NERVOUS SYSTEM AND SENSE ORGANS: Chronic | ICD-10-CM

## 2021-12-15 PROCEDURE — 93017 CV STRESS TEST TRACING ONLY: CPT

## 2021-12-15 PROCEDURE — 93016 CV STRESS TEST SUPVJ ONLY: CPT

## 2021-12-15 PROCEDURE — 93018 CV STRESS TEST I&R ONLY: CPT

## 2021-12-15 PROCEDURE — 78452 HT MUSCLE IMAGE SPECT MULT: CPT | Mod: 26

## 2021-12-15 PROCEDURE — A9500: CPT

## 2021-12-15 PROCEDURE — 78452 HT MUSCLE IMAGE SPECT MULT: CPT

## 2021-12-27 ENCOUNTER — APPOINTMENT (OUTPATIENT)
Dept: OTOLARYNGOLOGY | Facility: CLINIC | Age: 61
End: 2021-12-27
Payer: COMMERCIAL

## 2021-12-27 VITALS
BODY MASS INDEX: 30.48 KG/M2 | SYSTOLIC BLOOD PRESSURE: 143 MMHG | HEIGHT: 73 IN | DIASTOLIC BLOOD PRESSURE: 85 MMHG | HEART RATE: 86 BPM | WEIGHT: 230 LBS

## 2021-12-27 DIAGNOSIS — J34.2 DEVIATED NASAL SEPTUM: ICD-10-CM

## 2021-12-27 PROCEDURE — 92557 COMPREHENSIVE HEARING TEST: CPT

## 2021-12-27 PROCEDURE — 99213 OFFICE O/P EST LOW 20 MIN: CPT | Mod: 25

## 2021-12-27 PROCEDURE — 92567 TYMPANOMETRY: CPT

## 2021-12-27 PROCEDURE — 92504 EAR MICROSCOPY EXAMINATION: CPT

## 2021-12-27 NOTE — DATA REVIEWED
[de-identified] : type Ad/C tymps AU\par AD: hearing -8000 Hz\par AS: borderline -8000 Hz (slight conductive component at 250 Hz)\par 1) ENT F/U 2) re-eval as per MD

## 2021-12-27 NOTE — CONSULT LETTER
[FreeTextEntry2] : Flako Cardona MD [FreeTextEntry1] : Dear Flako,\par \par Franky Esquivel that is for follow-up for his left eustachian tube dysfunction and history of CSF leak.  His last visit with me was in summer 2021, and since this time, he reports continued hearing improvement without recurring drainage, pain, or headache.  Exam today shows an intact left tympanic membrane with stable posterior retraction, while the right eardrum is intact without effusion or retraction.  I personally ordered and reviewed an audiogram for his hearing loss, which shows normal hearing bilaterally with slight improvement of up to 10 dB in the right ear air-bone gap.  Overall he is doing well without any signs or symptoms suggestive of recurrent CSF leak or intracranial hypertension.  He may benefit from nasal sprays and antihistamines for his ongoing eustachian tube dysfunction, and I would like to reexamine his ears once more in 6 months.\par \par Thank you once again for the opportunity to participate in your patient's care, and I will keep you informed as to his progress.\par \par Best regards,\par \par Jai Carlson MD\par Otology/Neurotology\par [facility]\par [facility]

## 2021-12-27 NOTE — PHYSICAL EXAM
[Normal] : the left external ear was normal [de-identified] : ear drum appears normal; no effusion; no sign of infection [de-identified] : mild posterior retraction; no effusion; no sign of infection

## 2022-02-25 NOTE — H&P PST ADULT - PRO ANTICIPATED DISCH DISP
GEN:  no fever, no chills, no generalized weakness  NEURO:  no headache, no dizziness  ENT: no sore throat, no runny nose, +diminished hearing right ear  CV:  no chest pain, no palpitations  RESP:  no sob, no cough  GI:  no nausea, no vomiting, no abdominal pain, no diarrhea  :  no dysuria, no urinary frequency, no hematuria  MSK:  no joint pain, no edema  SKIN:  no rash, no bruising  HEME: +bright red blood per rectum
home

## 2022-06-27 ENCOUNTER — APPOINTMENT (OUTPATIENT)
Dept: OTOLARYNGOLOGY | Facility: CLINIC | Age: 62
End: 2022-06-27

## 2022-06-27 VITALS
DIASTOLIC BLOOD PRESSURE: 84 MMHG | SYSTOLIC BLOOD PRESSURE: 132 MMHG | HEIGHT: 73 IN | WEIGHT: 250 LBS | BODY MASS INDEX: 33.13 KG/M2 | HEART RATE: 80 BPM

## 2022-06-27 DIAGNOSIS — G96.01 CRANIAL CEREBROSPINAL FLUID LEAK, SPONTANEOUS: ICD-10-CM

## 2022-06-27 PROCEDURE — 92567 TYMPANOMETRY: CPT

## 2022-06-27 PROCEDURE — 92504 EAR MICROSCOPY EXAMINATION: CPT

## 2022-06-27 PROCEDURE — 99213 OFFICE O/P EST LOW 20 MIN: CPT | Mod: 25

## 2022-06-27 PROCEDURE — 92557 COMPREHENSIVE HEARING TEST: CPT

## 2022-06-27 NOTE — REASON FOR VISIT
[Subsequent Evaluation] : a subsequent evaluation for [FreeTextEntry2] : follow up on left eustachian tube dysfunction and history of CSF leak.

## 2022-06-27 NOTE — PHYSICAL EXAM
[Binocular Microscopic Exam] : Binocular microscopic exam was performed [Normal] : the left mastoid was normal [de-identified] : some retraction of ear drum; no fluid

## 2022-06-27 NOTE — ASSESSMENT
[FreeTextEntry1] : Reports no new issues with pain or drainage since last visit.  Some minor fluctuations in hearing.  Exam today shows intact left tympanic membrane without effusion but with posterior retraction.  The right tympanic membrane is intact without effusion or retraction.  Left postauricular and scalp incision appears well-healed, normal bilateral facial nerve function.  I personally ordered and reviewed an audiogram for his hearing loss, which shows minor decline bilaterally and type C tympanogram on the left side.  Recommended continued observation, monitor hearing in left ear at least annually.  Follow-up in office sooner for any signs of worsening persistent eustachian tube dysfunction.  May use antihistamine/nasal sprays as needed for ETD symptoms.

## 2022-06-27 NOTE — HISTORY OF PRESENT ILLNESS
[de-identified] : 62 y/o M presents for follow up for his left eustachian tube dysfunction and CSF leak.

## 2022-06-27 NOTE — DATA REVIEWED
[de-identified] : Tymps: Type Ad AD, type C AS\par Audio: AD- -8000 Hz, AS- Mild CHL rising to -8000 Hz\par Recs: 1. ENT f/u, 2. Re-eval as per MD

## 2023-06-26 ENCOUNTER — APPOINTMENT (OUTPATIENT)
Dept: OTOLARYNGOLOGY | Facility: CLINIC | Age: 63
End: 2023-06-26

## 2024-04-15 ENCOUNTER — APPOINTMENT (OUTPATIENT)
Dept: OTOLARYNGOLOGY | Facility: CLINIC | Age: 64
End: 2024-04-15
Payer: COMMERCIAL

## 2024-04-15 VITALS
BODY MASS INDEX: 33.8 KG/M2 | SYSTOLIC BLOOD PRESSURE: 153 MMHG | WEIGHT: 255 LBS | HEART RATE: 89 BPM | HEIGHT: 73 IN | DIASTOLIC BLOOD PRESSURE: 91 MMHG

## 2024-04-15 DIAGNOSIS — F17.290 NICOTINE DEPENDENCE, OTHER TOBACCO PRODUCT, UNCOMPLICATED: ICD-10-CM

## 2024-04-15 DIAGNOSIS — Z82.49 FAMILY HISTORY OF ISCHEMIC HEART DISEASE AND OTHER DISEASES OF THE CIRCULATORY SYSTEM: ICD-10-CM

## 2024-04-15 DIAGNOSIS — H61.22 IMPACTED CERUMEN, LEFT EAR: ICD-10-CM

## 2024-04-15 DIAGNOSIS — Z87.09 PERSONAL HISTORY OF OTHER DISEASES OF THE RESPIRATORY SYSTEM: ICD-10-CM

## 2024-04-15 PROCEDURE — 92550 TYMPANOMETRY & REFLEX THRESH: CPT

## 2024-04-15 PROCEDURE — 92557 COMPREHENSIVE HEARING TEST: CPT

## 2024-04-15 PROCEDURE — 31231 NASAL ENDOSCOPY DX: CPT

## 2024-04-15 PROCEDURE — G0268 REMOVAL OF IMPACTED WAX MD: CPT

## 2024-04-15 PROCEDURE — 99214 OFFICE O/P EST MOD 30 MIN: CPT | Mod: 25

## 2024-04-15 RX ORDER — PREDNISONE 10 MG/1
10 TABLET ORAL
Qty: 30 | Refills: 0 | Status: ACTIVE | COMMUNITY
Start: 2024-04-15 | End: 1900-01-01

## 2024-04-15 NOTE — PROCEDURE
[FreeTextEntry3] : Mild procedure note:  Left cerumenectomy  Description of Procedure:  Cerumen impaction was noted requiring debridement under the operating microscope using otologic instrumentation.  The patient tolerated the procedure without complications.  [FreeTextEntry6] : Procedure note: Nasal endoscopy  Description of Procedure:  Nasal endoscopy was performed because of recalcitrant symptoms of nasal obstruction and/or chronic rhinitis, and anterior anatomic abnormalities precluding visualization. Using a flexible endoscope with sheath, the nasal mucosa, septum, turbinates, and ostiomeatal complex were examined.    Nasal mucosa findings:  the nasal mucosa was edematous. Septum findings:  the septum showed no abnormalities. Nasopharynx findings:  the nasopharynx was normal. Middle meatus findings:  the middle meatus had no abnormalities. Sinuses findings:  the paranasal sinuses had no abnormalities.

## 2024-04-15 NOTE — ASSESSMENT
[FreeTextEntry1] : Exam today shows a retracted left tympanic membrane onto the promontory, dilma-colored serous effusion medially.  Right tympanic membrane intact without effusion or retraction.  Bilateral facial nerve function normal.  I reviewed new audiogram today which shows slight worsening of hearing thresholds in left ear relative to last testing.  Suspect recurrence of ETD based on color of effusion and advanced retraction in left ear, as opposed to CSF effusion.  Rx prednisone taper, follow-up 2 to 3 weeks.  If no improvement consider tympanocentesis with testing for beta-2 transferrin.

## 2024-05-06 ENCOUNTER — APPOINTMENT (OUTPATIENT)
Dept: OTOLARYNGOLOGY | Facility: CLINIC | Age: 64
End: 2024-05-06
Payer: COMMERCIAL

## 2024-05-06 VITALS
DIASTOLIC BLOOD PRESSURE: 83 MMHG | HEIGHT: 73 IN | WEIGHT: 255 LBS | SYSTOLIC BLOOD PRESSURE: 139 MMHG | HEART RATE: 94 BPM | BODY MASS INDEX: 33.8 KG/M2

## 2024-05-06 DIAGNOSIS — H69.92 UNSPECIFIED EUSTACHIAN TUBE DISORDER, LEFT EAR: ICD-10-CM

## 2024-05-06 DIAGNOSIS — Z82.2 FAMILY HISTORY OF DEAFNESS AND HEARING LOSS: ICD-10-CM

## 2024-05-06 PROCEDURE — 99214 OFFICE O/P EST MOD 30 MIN: CPT | Mod: 25

## 2024-05-06 PROCEDURE — 69420 INCISION OF EARDRUM: CPT | Mod: LT

## 2024-05-06 RX ORDER — OFLOXACIN OTIC 3 MG/ML
0.3 SOLUTION AURICULAR (OTIC) TWICE DAILY
Qty: 1 | Refills: 1 | Status: ACTIVE | COMMUNITY
Start: 2024-05-06 | End: 1900-01-01

## 2024-05-06 NOTE — ASSESSMENT
[FreeTextEntry1] : Patient reports minor subjective improvement in clogged ear sensation in left ear, no rhinorrhea.  Exam today shows persistent left effusion and retraction posteriorly.  Given persistence of effusion, recommend myringotomy and tympanocentesis, here to proceed this is performed today without complication.  Thick mucoid effusion was suctioned.  Rx Floxin drops to left ear for 3 days.  Follow-up several weeks for reassessment, if her effusion recurs and Beta-2 transferrin testing negative, consider tube placement.

## 2024-05-06 NOTE — PROCEDURE
[FreeTextEntry3] : Procedure note: Left myringotomy  Description of Operative Procedure:  Risks, benefits, and alternatives of the planned procedure were discussed with the patient prior to proceeding.  Risks would include but are not limited to bleeding, infection, persistent drainage, persistent perforation, or failure to improve hearing.  Benefits would include improvement in hearing.  Topical anesthetic was used to anesthetize the tympanic membrane.  A myringotomy was made.  Thick mucoid fluid was suctioned.   The patient tolerated the procedure without complications.

## 2024-05-09 LAB — BETA-2 TRANSFERRIN: NEGATIVE

## 2024-06-10 ENCOUNTER — APPOINTMENT (OUTPATIENT)
Dept: OTOLARYNGOLOGY | Facility: CLINIC | Age: 64
End: 2024-06-10
Payer: COMMERCIAL

## 2024-06-10 VITALS
HEART RATE: 79 BPM | BODY MASS INDEX: 33.8 KG/M2 | SYSTOLIC BLOOD PRESSURE: 128 MMHG | WEIGHT: 255 LBS | DIASTOLIC BLOOD PRESSURE: 82 MMHG | HEIGHT: 73 IN

## 2024-06-10 DIAGNOSIS — J31.0 CHRONIC RHINITIS: ICD-10-CM

## 2024-06-10 DIAGNOSIS — H93.292 OTHER ABNORMAL AUDITORY PERCEPTIONS, LEFT EAR: ICD-10-CM

## 2024-06-10 DIAGNOSIS — H65.492 OTHER CHRONIC NONSUPPURATIVE OTITIS MEDIA, LEFT EAR: ICD-10-CM

## 2024-06-10 DIAGNOSIS — H92.12 OTORRHEA, LEFT EAR: ICD-10-CM

## 2024-06-10 PROCEDURE — 99213 OFFICE O/P EST LOW 20 MIN: CPT | Mod: 25

## 2024-06-10 PROCEDURE — 92504 EAR MICROSCOPY EXAMINATION: CPT

## 2024-06-10 NOTE — ASSESSMENT
[FreeTextEntry1] : Reports resolution of pressure in left ear, slight improvement in hearing.  Feels as if symptoms are back to baseline.  Exam today shows intact right tympanic membrane with stable retraction, no visible effusion medially.  Right tympanic membrane appears normal.  Continue observation, follow-up fall/winter 2024 to ensure no recurrence of ETD symptoms.

## 2024-12-23 ENCOUNTER — EMERGENCY (EMERGENCY)
Facility: HOSPITAL | Age: 64
LOS: 1 days | Discharge: HOME | End: 2024-12-23
Attending: EMERGENCY MEDICINE
Payer: COMMERCIAL

## 2024-12-23 VITALS
OXYGEN SATURATION: 98 % | TEMPERATURE: 98 F | HEART RATE: 105 BPM | DIASTOLIC BLOOD PRESSURE: 74 MMHG | RESPIRATION RATE: 18 BRPM | SYSTOLIC BLOOD PRESSURE: 136 MMHG

## 2024-12-23 VITALS
SYSTOLIC BLOOD PRESSURE: 142 MMHG | RESPIRATION RATE: 17 BRPM | WEIGHT: 244.93 LBS | TEMPERATURE: 98 F | DIASTOLIC BLOOD PRESSURE: 83 MMHG | HEART RATE: 115 BPM | OXYGEN SATURATION: 97 %

## 2024-12-23 DIAGNOSIS — Z98.52 VASECTOMY STATUS: Chronic | ICD-10-CM

## 2024-12-23 DIAGNOSIS — Z86.69 PERSONAL HISTORY OF OTHER DISEASES OF THE NERVOUS SYSTEM AND SENSE ORGANS: Chronic | ICD-10-CM

## 2024-12-23 LAB
ALBUMIN SERPL ELPH-MCNC: 3.8 G/DL — SIGNIFICANT CHANGE UP (ref 3.3–5.2)
ALP SERPL-CCNC: 76 U/L — SIGNIFICANT CHANGE UP (ref 40–120)
ALT FLD-CCNC: 44 U/L — HIGH
ANION GAP SERPL CALC-SCNC: 14 MMOL/L — SIGNIFICANT CHANGE UP (ref 5–17)
AST SERPL-CCNC: 30 U/L — SIGNIFICANT CHANGE UP
BASOPHILS # BLD AUTO: 0.06 K/UL — SIGNIFICANT CHANGE UP (ref 0–0.2)
BASOPHILS NFR BLD AUTO: 0.4 % — SIGNIFICANT CHANGE UP (ref 0–2)
BILIRUB SERPL-MCNC: 0.2 MG/DL — LOW (ref 0.4–2)
BUN SERPL-MCNC: 23.1 MG/DL — HIGH (ref 8–20)
CALCIUM SERPL-MCNC: 9 MG/DL — SIGNIFICANT CHANGE UP (ref 8.4–10.5)
CHLORIDE SERPL-SCNC: 100 MMOL/L — SIGNIFICANT CHANGE UP (ref 96–108)
CK MB CFR SERPL CALC: 3 NG/ML — SIGNIFICANT CHANGE UP (ref 0–6.7)
CK SERPL-CCNC: 172 U/L — SIGNIFICANT CHANGE UP (ref 30–200)
CO2 SERPL-SCNC: 24 MMOL/L — SIGNIFICANT CHANGE UP (ref 22–29)
CREAT SERPL-MCNC: 1.07 MG/DL — SIGNIFICANT CHANGE UP (ref 0.5–1.3)
EGFR: 78 ML/MIN/1.73M2 — SIGNIFICANT CHANGE UP
EOSINOPHIL # BLD AUTO: 1.73 K/UL — HIGH (ref 0–0.5)
EOSINOPHIL NFR BLD AUTO: 11.2 % — HIGH (ref 0–6)
ERYTHROCYTE [SEDIMENTATION RATE] IN BLOOD: 2 MM/HR — SIGNIFICANT CHANGE UP (ref 0–15)
FLUAV AG NPH QL: SIGNIFICANT CHANGE UP
FLUBV AG NPH QL: SIGNIFICANT CHANGE UP
GLUCOSE SERPL-MCNC: 139 MG/DL — HIGH (ref 70–99)
HCT VFR BLD CALC: 49.5 % — SIGNIFICANT CHANGE UP (ref 39–50)
HGB BLD-MCNC: 16.8 G/DL — SIGNIFICANT CHANGE UP (ref 13–17)
IMM GRANULOCYTES NFR BLD AUTO: 0.4 % — SIGNIFICANT CHANGE UP (ref 0–0.9)
LYMPHOCYTES # BLD AUTO: 15.4 % — SIGNIFICANT CHANGE UP (ref 13–44)
LYMPHOCYTES # BLD AUTO: 2.38 K/UL — SIGNIFICANT CHANGE UP (ref 1–3.3)
MCHC RBC-ENTMCNC: 29.4 PG — SIGNIFICANT CHANGE UP (ref 27–34)
MCHC RBC-ENTMCNC: 33.9 G/DL — SIGNIFICANT CHANGE UP (ref 32–36)
MCV RBC AUTO: 86.5 FL — SIGNIFICANT CHANGE UP (ref 80–100)
MONOCYTES # BLD AUTO: 0.93 K/UL — HIGH (ref 0–0.9)
MONOCYTES NFR BLD AUTO: 6 % — SIGNIFICANT CHANGE UP (ref 2–14)
NEUTROPHILS # BLD AUTO: 10.34 K/UL — HIGH (ref 1.8–7.4)
NEUTROPHILS NFR BLD AUTO: 66.6 % — SIGNIFICANT CHANGE UP (ref 43–77)
PLATELET # BLD AUTO: 242 K/UL — SIGNIFICANT CHANGE UP (ref 150–400)
POTASSIUM SERPL-MCNC: 4.6 MMOL/L — SIGNIFICANT CHANGE UP (ref 3.5–5.3)
POTASSIUM SERPL-SCNC: 4.6 MMOL/L — SIGNIFICANT CHANGE UP (ref 3.5–5.3)
PROT SERPL-MCNC: 6.6 G/DL — SIGNIFICANT CHANGE UP (ref 6.6–8.7)
RBC # BLD: 5.72 M/UL — SIGNIFICANT CHANGE UP (ref 4.2–5.8)
RBC # FLD: 13.4 % — SIGNIFICANT CHANGE UP (ref 10.3–14.5)
RSV RNA NPH QL NAA+NON-PROBE: SIGNIFICANT CHANGE UP
SARS-COV-2 RNA SPEC QL NAA+PROBE: SIGNIFICANT CHANGE UP
SODIUM SERPL-SCNC: 138 MMOL/L — SIGNIFICANT CHANGE UP (ref 135–145)
WBC # BLD: 15.5 K/UL — HIGH (ref 3.8–10.5)
WBC # FLD AUTO: 15.5 K/UL — HIGH (ref 3.8–10.5)

## 2024-12-23 PROCEDURE — 99284 EMERGENCY DEPT VISIT MOD MDM: CPT | Mod: 25

## 2024-12-23 PROCEDURE — 82553 CREATINE MB FRACTION: CPT

## 2024-12-23 PROCEDURE — 87637 SARSCOV2&INF A&B&RSV AMP PRB: CPT

## 2024-12-23 PROCEDURE — 99284 EMERGENCY DEPT VISIT MOD MDM: CPT

## 2024-12-23 PROCEDURE — 36415 COLL VENOUS BLD VENIPUNCTURE: CPT

## 2024-12-23 PROCEDURE — 85025 COMPLETE CBC W/AUTO DIFF WBC: CPT

## 2024-12-23 PROCEDURE — 81001 URINALYSIS AUTO W/SCOPE: CPT

## 2024-12-23 PROCEDURE — 80053 COMPREHEN METABOLIC PANEL: CPT

## 2024-12-23 PROCEDURE — 84443 ASSAY THYROID STIM HORMONE: CPT

## 2024-12-23 PROCEDURE — 82550 ASSAY OF CK (CPK): CPT

## 2024-12-23 PROCEDURE — 85652 RBC SED RATE AUTOMATED: CPT

## 2024-12-23 PROCEDURE — 96374 THER/PROPH/DIAG INJ IV PUSH: CPT

## 2024-12-23 PROCEDURE — 96375 TX/PRO/DX INJ NEW DRUG ADDON: CPT

## 2024-12-23 RX ORDER — FAMOTIDINE 20 MG/1
20 TABLET, FILM COATED ORAL ONCE
Refills: 0 | Status: COMPLETED | OUTPATIENT
Start: 2024-12-23 | End: 2024-12-23

## 2024-12-23 RX ORDER — METHYLPREDNISOLONE SOD SUCC 125 MG
125 VIAL (EA) INJECTION ONCE
Refills: 0 | Status: COMPLETED | OUTPATIENT
Start: 2024-12-23 | End: 2024-12-23

## 2024-12-23 RX ORDER — CLINDAMYCIN HYDROCHLORIDE 300 MG/1
1 CAPSULE ORAL
Qty: 30 | Refills: 0
Start: 2024-12-23 | End: 2025-01-01

## 2024-12-23 RX ORDER — PREDNISONE 20 MG/1
1 TABLET ORAL
Qty: 5 | Refills: 0
Start: 2024-12-23 | End: 2024-12-27

## 2024-12-23 RX ORDER — CLINDAMYCIN HYDROCHLORIDE 300 MG/1
300 CAPSULE ORAL ONCE
Refills: 0 | Status: COMPLETED | OUTPATIENT
Start: 2024-12-23 | End: 2024-12-23

## 2024-12-23 RX ORDER — 0.9 % SODIUM CHLORIDE 0.9 %
1000 INTRAVENOUS SOLUTION INTRAVENOUS ONCE
Refills: 0 | Status: COMPLETED | OUTPATIENT
Start: 2024-12-23 | End: 2024-12-23

## 2024-12-23 RX ORDER — DIPHENHYDRAMINE HCL 25 MG
50 CAPSULE ORAL ONCE
Refills: 0 | Status: COMPLETED | OUTPATIENT
Start: 2024-12-23 | End: 2024-12-23

## 2024-12-23 RX ADMIN — Medication 125 MILLIGRAM(S): at 20:26

## 2024-12-23 RX ADMIN — Medication 50 MILLIGRAM(S): at 20:25

## 2024-12-23 RX ADMIN — CLINDAMYCIN HYDROCHLORIDE 300 MILLIGRAM(S): 300 CAPSULE ORAL at 23:11

## 2024-12-23 RX ADMIN — Medication 1000 MILLILITER(S): at 20:25

## 2024-12-23 RX ADMIN — FAMOTIDINE 20 MILLIGRAM(S): 20 TABLET, FILM COATED ORAL at 20:26

## 2024-12-23 NOTE — ED ADULT NURSE NOTE - OBJECTIVE STATEMENT
PT is alert and oriented, with a patent and self maintained airway, with non labored breathing.  PT c/o of rash x2 weeks on chest and arms, denies exposre to allergens, new soaps/detergents. PT denies CP, SOB, HA, N/V/D, Fevers or chills.

## 2024-12-23 NOTE — ED PROVIDER NOTE - PATIENT PORTAL LINK FT
You can access the FollowMyHealth Patient Portal offered by [facility] by registering at the following website: [email or web address]. By joining [facility]’s FollowMyHealth portal, you will also be able to view your health information using other applications (apps) compatible with our system.

## 2024-12-23 NOTE — ED PROVIDER NOTE - CLINICAL SUMMARY MEDICAL DECISION MAKING FREE TEXT BOX
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  c/o chills today.  c/o severe pruritis.  denies fever. denies n/v. denies abd pain. denies recent URI or cough.  denies pain with BMs. denies pain in mouth.  denies new medications except Monjouro that started 2 weeks ago.  reports using Light Wave phototherapy with patches to cervical area since August. will do labs, steroids, re-eval. will provide po abx for overlying cellulitis. will provide dermatology for f/up and strict return precautions.

## 2024-12-23 NOTE — ED PROVIDER NOTE - OBJECTIVE STATEMENT
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  c/o chills today.  c/o severe pruritis.  denies fever. denies n/v. denies abd pain. denies recent URI or cough.  denies pain with BMs. denies pain in mouth.  denies new medications except Monjouro that started 2 weeks ago.  reports using Light Wave phototherapy with patches to cervical area since August.

## 2024-12-23 NOTE — ED ADULT TRIAGE NOTE - CHIEF COMPLAINT QUOTE
"full body itchy rash" x > 1 week. was on prednisone w/ no relief. + chills, denies SOB/wheezing/throat pain/difficulty swallowing

## 2024-12-23 NOTE — ED PROVIDER NOTE - NS ED ROS FT
Constitutional: (-) fever  (+)chills  (-)sweats  Eyes/ENT: (-)   Cardiovascular: (-) chest pain, (-) palpitations (-) edema   Respiratory: (-) cough, (-) shortness of breath   Gastrointestinal: (-)nausea  (-)vomiting, (-) diarrhea  (-) abdominal pain   :  (-)dysuria, (-)frequency, (-)urgency, (-)hematuria  Musculoskeletal: (-) neck pain, (-) back pain, (-) joint pain  Integumentary: (+ rash, (-) edema  Neurological: (-) headache, (-) altered mental status  (-)LOC

## 2024-12-23 NOTE — ED PROVIDER NOTE - PHYSICAL EXAMINATION
General:     NAD  Head:     NC/AT, EOMI, oral mucosa moist  Neck:     trachea midline  Lungs:     CTA b/l, no w/r/r  CVS:     S1S2, RRR, no m/g/r  Abd:     +BS, s/nt/nd, no organomegaly  Ext:    2+ radial and pedal pulses, no c/c/e  Skin: erythamatous patchy scaly plaques over torso, arms, legs, palms, but not soles. no involvment of mucosal surfaces.   Neuro: AAOx3, no sensory/motor deficits

## 2024-12-23 NOTE — ED PROVIDER NOTE - NSFOLLOWUPINSTRUCTIONS_ED_ALL_ED_FT
You are advised to please follow up with your primary care doctor within the next 24 hours and return to the Emergency Department for worsening symptoms or any other concerns.  Your doctor may call [phone] to follow up on the specific results of the tests performed today in the emergency department.    Rash    A rash is a change in the color of the skin. A rash can also change the way your skin feels. There are many different conditions and factors that can cause a rash, most of which are not dangerous. Make sure to follow up with your primary care physician or a dermatologist as instructed by your health care provider.    SEEK IMMEDIATE MEDICAL CARE IF YOU HAVE ANY OF THE FOLLOWING SYMPTOMS: fever, blisters, a rash inside your mouth, vaginal or anal pain, or altered mental status.

## 2024-12-24 LAB
APPEARANCE UR: CLEAR — SIGNIFICANT CHANGE UP
BACTERIA # UR AUTO: NEGATIVE /HPF — SIGNIFICANT CHANGE UP
BILIRUB UR-MCNC: NEGATIVE — SIGNIFICANT CHANGE UP
CAST: 1 /LPF — SIGNIFICANT CHANGE UP (ref 0–4)
COLOR SPEC: YELLOW — SIGNIFICANT CHANGE UP
DIFF PNL FLD: NEGATIVE — SIGNIFICANT CHANGE UP
GLUCOSE UR QL: NEGATIVE MG/DL — SIGNIFICANT CHANGE UP
KETONES UR-MCNC: 15 MG/DL
LEUKOCYTE ESTERASE UR-ACNC: NEGATIVE — SIGNIFICANT CHANGE UP
NITRITE UR-MCNC: NEGATIVE — SIGNIFICANT CHANGE UP
PH UR: 5.5 — SIGNIFICANT CHANGE UP (ref 5–8)
PROT UR-MCNC: NEGATIVE MG/DL — SIGNIFICANT CHANGE UP
RBC CASTS # UR COMP ASSIST: 1 /HPF — SIGNIFICANT CHANGE UP (ref 0–4)
SP GR SPEC: 1.02 — SIGNIFICANT CHANGE UP (ref 1–1.03)
SQUAMOUS # UR AUTO: 1 /HPF — SIGNIFICANT CHANGE UP (ref 0–5)
UROBILINOGEN FLD QL: 1 MG/DL — SIGNIFICANT CHANGE UP (ref 0.2–1)
WBC UR QL: 1 /HPF — SIGNIFICANT CHANGE UP (ref 0–5)

## 2025-01-06 NOTE — H&P PST ADULT - NSICDXFAMILYHX_GEN_ALL_CORE_FT
Addended by: MYRA BARRETO on: 1/6/2025 01:19 PM     Modules accepted: Orders    
FAMILY HISTORY:  FH: heart disease, father